# Patient Record
Sex: MALE | Race: WHITE | NOT HISPANIC OR LATINO | Employment: FULL TIME | URBAN - METROPOLITAN AREA
[De-identification: names, ages, dates, MRNs, and addresses within clinical notes are randomized per-mention and may not be internally consistent; named-entity substitution may affect disease eponyms.]

---

## 2023-03-08 PROBLEM — B37.2 SKIN YEAST INFECTION: Status: ACTIVE | Noted: 2023-03-08

## 2023-03-08 PROBLEM — R26.2 DIFFICULTY WALKING: Status: ACTIVE | Noted: 2023-03-08

## 2023-03-08 PROBLEM — F32.A DEPRESSION: Status: ACTIVE | Noted: 2023-03-08

## 2023-03-08 PROBLEM — E11.9 TYPE 2 DIABETES MELLITUS (MULTI): Status: ACTIVE | Noted: 2023-03-08

## 2023-03-08 PROBLEM — M25.552 LEFT HIP PAIN: Status: ACTIVE | Noted: 2023-03-08

## 2023-03-08 PROBLEM — M21.70 SHORT LEG SYNDROME, RIGHT, ACQUIRED: Status: ACTIVE | Noted: 2023-03-08

## 2023-03-08 PROBLEM — R05.9 COUGH IN ADULT: Status: ACTIVE | Noted: 2023-03-08
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
## 2023-03-08 PROBLEM — M25.859 HIP IMPINGEMENT SYNDROME: Status: ACTIVE | Noted: 2023-03-08

## 2023-03-08 PROBLEM — J06.9 ACUTE URI: Status: ACTIVE | Noted: 2023-03-08

## 2023-03-08 PROBLEM — M54.10 RADICULAR PAIN OF LEFT LOWER EXTREMITY: Status: ACTIVE | Noted: 2023-03-08

## 2023-03-08 PROBLEM — M65.4 TENOSYNOVITIS, DE QUERVAIN: Status: ACTIVE | Noted: 2023-03-08

## 2023-03-08 PROBLEM — E78.5 HLD (HYPERLIPIDEMIA): Status: ACTIVE | Noted: 2023-03-08

## 2023-03-08 RX ORDER — LISINOPRIL 10 MG/1
10 TABLET ORAL DAILY
COMMUNITY
Start: 2019-05-07 | End: 2023-11-02 | Stop reason: SDUPTHER

## 2023-03-08 RX ORDER — CITALOPRAM 40 MG/1
1 TABLET, FILM COATED ORAL DAILY
COMMUNITY
Start: 2021-05-06 | End: 2024-01-12 | Stop reason: SDUPTHER

## 2023-03-08 RX ORDER — METFORMIN HYDROCHLORIDE 500 MG/1
TABLET, EXTENDED RELEASE ORAL
COMMUNITY
Start: 2019-11-19 | End: 2024-02-23 | Stop reason: SDUPTHER

## 2023-03-08 RX ORDER — ATORVASTATIN CALCIUM 40 MG/1
1 TABLET, FILM COATED ORAL NIGHTLY
COMMUNITY
Start: 2019-11-19 | End: 2023-07-13 | Stop reason: SDUPTHER

## 2023-03-08 RX ORDER — BLOOD SUGAR DIAGNOSTIC
STRIP MISCELLANEOUS
COMMUNITY
Start: 2019-11-19 | End: 2024-03-19

## 2023-03-08 RX ORDER — MELOXICAM 7.5 MG/1
1 TABLET ORAL DAILY PRN
COMMUNITY
Start: 2022-02-03 | End: 2024-01-12 | Stop reason: SDUPTHER

## 2023-03-08 RX ORDER — GLIMEPIRIDE 2 MG/1
2 TABLET ORAL
COMMUNITY
Start: 2021-01-12 | End: 2024-01-08

## 2023-03-08 RX ORDER — SOD SULF/POT CHLORIDE/MAG SULF 1.479 G
TABLET ORAL
COMMUNITY
Start: 2022-09-06 | End: 2024-03-19 | Stop reason: WASHOUT

## 2023-03-09 PROBLEM — E66.3 OVERWEIGHT WITH BODY MASS INDEX (BMI) OF 27 TO 27.9 IN ADULT: Status: ACTIVE | Noted: 2023-03-09

## 2023-03-20 ENCOUNTER — APPOINTMENT (OUTPATIENT)
Dept: PRIMARY CARE | Facility: CLINIC | Age: 48
End: 2023-03-20
Payer: COMMERCIAL

## 2023-03-27 ENCOUNTER — APPOINTMENT (OUTPATIENT)
Dept: PRIMARY CARE | Facility: CLINIC | Age: 48
End: 2023-03-27
Payer: COMMERCIAL

## 2023-04-10 ENCOUNTER — APPOINTMENT (OUTPATIENT)
Dept: LAB | Facility: LAB | Age: 48
End: 2023-04-10
Payer: COMMERCIAL

## 2023-04-10 ENCOUNTER — OFFICE VISIT (OUTPATIENT)
Dept: PRIMARY CARE | Facility: CLINIC | Age: 48
End: 2023-04-10
Payer: COMMERCIAL

## 2023-04-10 VITALS
BODY MASS INDEX: 27.96 KG/M2 | HEART RATE: 85 BPM | TEMPERATURE: 97.3 F | RESPIRATION RATE: 16 BRPM | SYSTOLIC BLOOD PRESSURE: 108 MMHG | OXYGEN SATURATION: 95 % | DIASTOLIC BLOOD PRESSURE: 71 MMHG | WEIGHT: 200.5 LBS

## 2023-04-10 DIAGNOSIS — E11.9 TYPE 2 DIABETES MELLITUS WITHOUT COMPLICATION, WITHOUT LONG-TERM CURRENT USE OF INSULIN (MULTI): Primary | ICD-10-CM

## 2023-04-10 DIAGNOSIS — Z12.5 SCREENING PSA (PROSTATE SPECIFIC ANTIGEN): ICD-10-CM

## 2023-04-10 DIAGNOSIS — Z00.00 ROUTINE GENERAL MEDICAL EXAMINATION AT A HEALTH CARE FACILITY: ICD-10-CM

## 2023-04-10 DIAGNOSIS — F32.A DEPRESSION, UNSPECIFIED DEPRESSION TYPE: ICD-10-CM

## 2023-04-10 LAB — POC HEMOGLOBIN A1C: 8.1 % (ref 4.2–6.5)

## 2023-04-10 PROCEDURE — 3078F DIAST BP <80 MM HG: CPT | Performed by: STUDENT IN AN ORGANIZED HEALTH CARE EDUCATION/TRAINING PROGRAM

## 2023-04-10 PROCEDURE — 4010F ACE/ARB THERAPY RXD/TAKEN: CPT | Performed by: STUDENT IN AN ORGANIZED HEALTH CARE EDUCATION/TRAINING PROGRAM

## 2023-04-10 PROCEDURE — 83036 HEMOGLOBIN GLYCOSYLATED A1C: CPT | Performed by: STUDENT IN AN ORGANIZED HEALTH CARE EDUCATION/TRAINING PROGRAM

## 2023-04-10 PROCEDURE — 99213 OFFICE O/P EST LOW 20 MIN: CPT | Performed by: STUDENT IN AN ORGANIZED HEALTH CARE EDUCATION/TRAINING PROGRAM

## 2023-04-10 PROCEDURE — 3074F SYST BP LT 130 MM HG: CPT | Performed by: STUDENT IN AN ORGANIZED HEALTH CARE EDUCATION/TRAINING PROGRAM

## 2023-04-10 PROCEDURE — 99396 PREV VISIT EST AGE 40-64: CPT | Performed by: STUDENT IN AN ORGANIZED HEALTH CARE EDUCATION/TRAINING PROGRAM

## 2023-04-10 RX ORDER — BUPROPION HYDROCHLORIDE 150 MG/1
150 TABLET ORAL DAILY
Qty: 90 TABLET | Refills: 1 | Status: SHIPPED | OUTPATIENT
Start: 2023-04-10 | End: 2023-05-10 | Stop reason: SDUPTHER

## 2023-04-10 SDOH — ECONOMIC STABILITY: HOUSING INSECURITY
IN THE LAST 12 MONTHS, WAS THERE A TIME WHEN YOU DID NOT HAVE A STEADY PLACE TO SLEEP OR SLEPT IN A SHELTER (INCLUDING NOW)?: NO

## 2023-04-10 SDOH — HEALTH STABILITY: PHYSICAL HEALTH: ON AVERAGE, HOW MANY DAYS PER WEEK DO YOU ENGAGE IN MODERATE TO STRENUOUS EXERCISE (LIKE A BRISK WALK)?: 3 DAYS

## 2023-04-10 SDOH — ECONOMIC STABILITY: FOOD INSECURITY: WITHIN THE PAST 12 MONTHS, THE FOOD YOU BOUGHT JUST DIDN'T LAST AND YOU DIDN'T HAVE MONEY TO GET MORE.: NEVER TRUE

## 2023-04-10 SDOH — ECONOMIC STABILITY: FOOD INSECURITY: WITHIN THE PAST 12 MONTHS, YOU WORRIED THAT YOUR FOOD WOULD RUN OUT BEFORE YOU GOT MONEY TO BUY MORE.: NEVER TRUE

## 2023-04-10 SDOH — ECONOMIC STABILITY: INCOME INSECURITY: IN THE LAST 12 MONTHS, WAS THERE A TIME WHEN YOU WERE NOT ABLE TO PAY THE MORTGAGE OR RENT ON TIME?: NO

## 2023-04-10 SDOH — HEALTH STABILITY: PHYSICAL HEALTH: ON AVERAGE, HOW MANY MINUTES DO YOU ENGAGE IN EXERCISE AT THIS LEVEL?: 30 MIN

## 2023-04-10 SDOH — ECONOMIC STABILITY: TRANSPORTATION INSECURITY
IN THE PAST 12 MONTHS, HAS THE LACK OF TRANSPORTATION KEPT YOU FROM MEDICAL APPOINTMENTS OR FROM GETTING MEDICATIONS?: NO

## 2023-04-10 SDOH — ECONOMIC STABILITY: TRANSPORTATION INSECURITY
IN THE PAST 12 MONTHS, HAS LACK OF TRANSPORTATION KEPT YOU FROM MEETINGS, WORK, OR FROM GETTING THINGS NEEDED FOR DAILY LIVING?: NO

## 2023-04-10 ASSESSMENT — ENCOUNTER SYMPTOMS
HEMATURIA: 0
CONSTIPATION: 0
MYALGIAS: 0
PALPITATIONS: 0
DIARRHEA: 0
EYE PAIN: 0
SHORTNESS OF BREATH: 0
ARTHRALGIAS: 0
LOSS OF SENSATION IN FEET: 0
WHEEZING: 0
LIGHT-HEADEDNESS: 0
VOMITING: 0
SORE THROAT: 0
ABDOMINAL PAIN: 0
BLOOD IN STOOL: 0
CHILLS: 0
POLYDIPSIA: 0
WEAKNESS: 0
HEADACHES: 0
NUMBNESS: 0
OCCASIONAL FEELINGS OF UNSTEADINESS: 0
NAUSEA: 0
FEVER: 0
RHINORRHEA: 0
DEPRESSION: 0
COUGH: 0
DIZZINESS: 0
DYSURIA: 0
ADENOPATHY: 0

## 2023-04-10 ASSESSMENT — SOCIAL DETERMINANTS OF HEALTH (SDOH)
HOW OFTEN DO YOU ATTEND CHURCH OR RELIGIOUS SERVICES?: NEVER
WITHIN THE LAST YEAR, HAVE TO BEEN RAPED OR FORCED TO HAVE ANY KIND OF SEXUAL ACTIVITY BY YOUR PARTNER OR EX-PARTNER?: NO
WITHIN THE LAST YEAR, HAVE YOU BEEN AFRAID OF YOUR PARTNER OR EX-PARTNER?: NO
HOW OFTEN DO YOU ATTENT MEETINGS OF THE CLUB OR ORGANIZATION YOU BELONG TO?: NEVER
HOW HARD IS IT FOR YOU TO PAY FOR THE VERY BASICS LIKE FOOD, HOUSING, MEDICAL CARE, AND HEATING?: NOT HARD AT ALL
WITHIN THE LAST YEAR, HAVE YOU BEEN HUMILIATED OR EMOTIONALLY ABUSED IN OTHER WAYS BY YOUR PARTNER OR EX-PARTNER?: NO
WITHIN THE LAST YEAR, HAVE YOU BEEN KICKED, HIT, SLAPPED, OR OTHERWISE PHYSICALLY HURT BY YOUR PARTNER OR EX-PARTNER?: NO
IN A TYPICAL WEEK, HOW MANY TIMES DO YOU TALK ON THE PHONE WITH FAMILY, FRIENDS, OR NEIGHBORS?: TWICE A WEEK
HOW OFTEN DO YOU GET TOGETHER WITH FRIENDS OR RELATIVES?: TWICE A WEEK
DO YOU BELONG TO ANY CLUBS OR ORGANIZATIONS SUCH AS CHURCH GROUPS UNIONS, FRATERNAL OR ATHLETIC GROUPS, OR SCHOOL GROUPS?: NO

## 2023-04-10 ASSESSMENT — LIFESTYLE VARIABLES
HOW OFTEN DO YOU HAVE SIX OR MORE DRINKS ON ONE OCCASION: NEVER
HOW MANY STANDARD DRINKS CONTAINING ALCOHOL DO YOU HAVE ON A TYPICAL DAY: PATIENT DOES NOT DRINK
SKIP TO QUESTIONS 9-10: 1
AUDIT-C TOTAL SCORE: 0
HOW OFTEN DO YOU HAVE A DRINK CONTAINING ALCOHOL: NEVER

## 2023-04-10 NOTE — PROGRESS NOTES
Subjective   Danial Navarro Jr. is a 47 y.o. male who is here for a routine exam.  Active Problem List      Comprehensive Medical/Surgical/Social/Family History  Past Medical History:   Diagnosis Date    Personal history of diseases of the skin and subcutaneous tissue 10/27/2018    History of cellulitis and abscess     No past surgical history on file.  Social History     Social History Narrative    Not on file         Allergies and Medications  Penicillins  Current Outpatient Medications on File Prior to Visit   Medication Sig Dispense Refill    atorvastatin (Lipitor) 40 mg tablet Take 1 tablet (40 mg) by mouth once daily at bedtime.      blood sugar diagnostic (Accu-Chek Guide test strips) strip Per directed      citalopram (CeleXA) 40 mg tablet Take 1 tablet (40 mg) by mouth once daily.      glimepiride (Amaryl) 2 mg tablet Take 1 tablet (2 mg) by mouth. TAKE 1 AND 1/2 TAB DAILY      lisinopril 10 mg tablet Take 1 tablet (10 mg) by mouth once daily.      meloxicam (Mobic) 7.5 mg tablet Take 1 tablet (7.5 mg) by mouth once daily as needed.      metFORMIN XR (Glucophage-XR) 500 mg 24 hr tablet Take by mouth. 2 TABLETS TWICE DIALY      sod sulf-pot chloride-mag sulf (Sutab) 1.479-0.188- 0.225 gram tablet Take by mouth. TAKE PER DIRECTED ON BOX       No current facility-administered medications on file prior to visit.       New concerns:  Depression stable. Unsure of what medication he is on.     A1c - 8.1% (stable from July 2022, 8.2%)  Checks BG: . During the day typically 120-130s.    Lifestyle  Diet: Balanced diet. No fast food. Rare sweets. Diet sodas - LOTS.  Exercise: Not as active. Starting to walk more.  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    Colonoscopy 10/2022 - follow up 5 years.  Tdap 2020.    Diabetic Review of Systems - medication compliance: compliant most of the time, diabetic diet compliance: noncompliant some of the time.    none  Hemoglobin A1c has been 8 the  last 2 times, he states he can improve his diet and exercise.  Been gradually improving over the past several weeks.  He does not want to start any medication at this time.    No additional diabetic complications        Review of Systems   Constitutional:  Negative for chills and fever.   HENT:  Negative for congestion, ear pain, rhinorrhea and sore throat.    Eyes:  Negative for pain and visual disturbance.   Respiratory:  Negative for cough, shortness of breath and wheezing.    Cardiovascular:  Negative for chest pain, palpitations and leg swelling.   Gastrointestinal:  Negative for abdominal pain, blood in stool, constipation, diarrhea, nausea and vomiting.   Endocrine: Negative for polydipsia and polyuria.   Genitourinary:  Negative for dysuria and hematuria.   Musculoskeletal:  Negative for arthralgias and myalgias.   Skin:  Negative for rash.   Neurological:  Negative for dizziness, syncope, weakness, light-headedness, numbness and headaches.   Hematological:  Negative for adenopathy.   Psychiatric/Behavioral:  Negative for self-injury and suicidal ideas.        Objective   /71 (BP Location: Right arm, Patient Position: Sitting)   Pulse 85   Temp 36.3 °C (97.3 °F) (Temporal)   Resp 16   Wt 90.9 kg (200 lb 8 oz)   SpO2 95%   BMI 27.96 kg/m²     Physical Exam  Vitals and nursing note reviewed.   Constitutional:       General: He is not in acute distress.     Appearance: Normal appearance. He is not toxic-appearing.   HENT:      Head: Normocephalic and atraumatic.      Right Ear: Tympanic membrane, ear canal and external ear normal.      Left Ear: Tympanic membrane, ear canal and external ear normal.      Nose: Nose normal. No rhinorrhea.      Mouth/Throat:      Mouth: Mucous membranes are moist.      Pharynx: Oropharynx is clear. No oropharyngeal exudate or posterior oropharyngeal erythema.   Eyes:      Extraocular Movements: Extraocular movements intact.      Conjunctiva/sclera: Conjunctivae normal.       Pupils: Pupils are equal, round, and reactive to light.   Neck:      Thyroid: No thyroid mass or thyroid tenderness.   Cardiovascular:      Rate and Rhythm: Normal rate and regular rhythm.      Pulses: Normal pulses.      Heart sounds: Normal heart sounds. No murmur heard.  Pulmonary:      Effort: Pulmonary effort is normal. No respiratory distress.      Breath sounds: Normal breath sounds.   Abdominal:      General: Abdomen is flat. Bowel sounds are normal.      Palpations: Abdomen is soft.      Tenderness: There is no abdominal tenderness. There is no guarding or rebound.   Musculoskeletal:         General: Normal range of motion.      Cervical back: Normal range of motion.      Right lower leg: No edema.      Left lower leg: No edema.      Comments: Strength and sensation intact b/l UE and LE.    Lymphadenopathy:      Cervical: No cervical adenopathy.   Skin:     General: Skin is warm and dry.   Neurological:      General: No focal deficit present.      Mental Status: He is alert and oriented to person, place, and time. Mental status is at baseline.      Cranial Nerves: No cranial nerve deficit.      Sensory: No sensory deficit.      Motor: No weakness.      Coordination: Coordination normal.      Gait: Gait normal.   Psychiatric:         Mood and Affect: Mood normal.         Speech: Speech normal.         Behavior: Behavior normal.         Thought Content: Thought content normal.         Judgment: Judgment normal.       Assessment/Plan   Problem List Items Addressed This Visit          Endocrine/Metabolic    Type 2 diabetes mellitus (CMS/HCC) - Primary    Relevant Orders    Follow Up In [facility] - PCP    CBC    Comprehensive Metabolic Panel    Lipid Panel    POCT glycosylated hemoglobin (Hb A1C) manually resulted (Completed)     Other Visit Diagnoses       Routine general medical examination at a health care facility        Relevant Orders    CBC    Comprehensive Metabolic Panel    Lipid Panel     Screening PSA (prostate specific antigen)        Relevant Orders    Prostate Specific Antigen            Reviewed Social Determinants of health with patient, discussed healthy lifestyle including 150 minutes of physical activity per week  Ordered/Reviewed baseline labwork -CBC, CMP, Lipid Panel, PSA  Immunizations Up-to-Date    Colonoscopy 2022  We will get routine lab work add hemoglobin A1c today.    We will get prostate screening earlier due to symptoms.    Follow-up in 3 months for evaluation of new Wellbutrin to aid with mood.  Specifically motivation and help with side effect of decreased appetite.    Recheck hemoglobin A1c at that time, consider SGLT2 or GLP-1 if necessary.

## 2023-04-14 ENCOUNTER — LAB (OUTPATIENT)
Dept: LAB | Facility: LAB | Age: 48
End: 2023-04-14
Payer: COMMERCIAL

## 2023-04-14 DIAGNOSIS — Z12.5 SCREENING PSA (PROSTATE SPECIFIC ANTIGEN): ICD-10-CM

## 2023-04-14 DIAGNOSIS — Z00.00 ROUTINE GENERAL MEDICAL EXAMINATION AT A HEALTH CARE FACILITY: ICD-10-CM

## 2023-04-14 DIAGNOSIS — E11.9 TYPE 2 DIABETES MELLITUS WITHOUT COMPLICATION, WITHOUT LONG-TERM CURRENT USE OF INSULIN (MULTI): ICD-10-CM

## 2023-04-14 PROCEDURE — 84153 ASSAY OF PSA TOTAL: CPT

## 2023-04-14 PROCEDURE — 85027 COMPLETE CBC AUTOMATED: CPT

## 2023-04-14 PROCEDURE — 80061 LIPID PANEL: CPT

## 2023-04-14 PROCEDURE — 80053 COMPREHEN METABOLIC PANEL: CPT

## 2023-04-14 PROCEDURE — 36415 COLL VENOUS BLD VENIPUNCTURE: CPT

## 2023-04-14 PROCEDURE — 84443 ASSAY THYROID STIM HORMONE: CPT

## 2023-04-15 LAB
ALANINE AMINOTRANSFERASE (SGPT) (U/L) IN SER/PLAS: 36 U/L (ref 10–52)
ALBUMIN (G/DL) IN SER/PLAS: 4.7 G/DL (ref 3.4–5)
ALKALINE PHOSPHATASE (U/L) IN SER/PLAS: 112 U/L (ref 33–120)
ANION GAP IN SER/PLAS: 14 MMOL/L (ref 10–20)
ASPARTATE AMINOTRANSFERASE (SGOT) (U/L) IN SER/PLAS: 18 U/L (ref 9–39)
BILIRUBIN TOTAL (MG/DL) IN SER/PLAS: 1.3 MG/DL (ref 0–1.2)
CALCIUM (MG/DL) IN SER/PLAS: 10.4 MG/DL (ref 8.6–10.6)
CARBON DIOXIDE, TOTAL (MMOL/L) IN SER/PLAS: 27 MMOL/L (ref 21–32)
CHLORIDE (MMOL/L) IN SER/PLAS: 103 MMOL/L (ref 98–107)
CHOLESTEROL (MG/DL) IN SER/PLAS: 138 MG/DL (ref 0–199)
CHOLESTEROL IN HDL (MG/DL) IN SER/PLAS: 35 MG/DL
CHOLESTEROL/HDL RATIO: 3.9
CREATININE (MG/DL) IN SER/PLAS: 0.95 MG/DL (ref 0.5–1.3)
ERYTHROCYTE DISTRIBUTION WIDTH (RATIO) BY AUTOMATED COUNT: 14.1 % (ref 11.5–14.5)
ERYTHROCYTE MEAN CORPUSCULAR HEMOGLOBIN CONCENTRATION (G/DL) BY AUTOMATED: 31.9 G/DL (ref 32–36)
ERYTHROCYTE MEAN CORPUSCULAR VOLUME (FL) BY AUTOMATED COUNT: 89 FL (ref 80–100)
ERYTHROCYTES (10*6/UL) IN BLOOD BY AUTOMATED COUNT: 5.33 X10E12/L (ref 4.5–5.9)
GFR MALE: >90 ML/MIN/1.73M2
GLUCOSE (MG/DL) IN SER/PLAS: 222 MG/DL (ref 74–99)
HEMATOCRIT (%) IN BLOOD BY AUTOMATED COUNT: 47.3 % (ref 41–52)
HEMOGLOBIN (G/DL) IN BLOOD: 15.1 G/DL (ref 13.5–17.5)
LDL: 61 MG/DL (ref 0–99)
LEUKOCYTES (10*3/UL) IN BLOOD BY AUTOMATED COUNT: 6 X10E9/L (ref 4.4–11.3)
NON HDL CHOLESTEROL: 103 MG/DL
PLATELETS (10*3/UL) IN BLOOD AUTOMATED COUNT: 167 X10E9/L (ref 150–450)
POTASSIUM (MMOL/L) IN SER/PLAS: 5.7 MMOL/L (ref 3.5–5.3)
PROSTATE SPECIFIC AG (NG/ML) IN SER/PLAS: 0.56 NG/ML (ref 0–4)
PROTEIN TOTAL: 7 G/DL (ref 6.4–8.2)
SODIUM (MMOL/L) IN SER/PLAS: 138 MMOL/L (ref 136–145)
THYROTROPIN (MIU/L) IN SER/PLAS BY DETECTION LIMIT <= 0.05 MIU/L: 2.3 MIU/L (ref 0.44–3.98)
TRIGLYCERIDE (MG/DL) IN SER/PLAS: 208 MG/DL (ref 0–149)
UREA NITROGEN (MG/DL) IN SER/PLAS: 12 MG/DL (ref 6–23)
VLDL: 42 MG/DL (ref 0–40)

## 2023-04-17 ENCOUNTER — TELEPHONE (OUTPATIENT)
Dept: PRIMARY CARE | Facility: CLINIC | Age: 48
End: 2023-04-17
Payer: COMMERCIAL

## 2023-04-17 NOTE — TELEPHONE ENCOUNTER
----- Message from Akbar Youssef DO sent at 4/16/2023  9:16 AM EDT -----  Please call patient back with lab results.  Let him know that blood sugar was elevated consistent with diabetes, but potassium is also elevated.  It is only mildly elevated but something that we are going to keep an eye on, I want to recheck that level and your blood sugar in 3 months.  Please schedule that appointment and then call prior to the appointment to set up lab work.

## 2023-05-10 DIAGNOSIS — F32.A DEPRESSION, UNSPECIFIED DEPRESSION TYPE: ICD-10-CM

## 2023-05-10 RX ORDER — BUPROPION HYDROCHLORIDE 150 MG/1
150 TABLET ORAL DAILY
Qty: 90 TABLET | Refills: 1 | Status: SHIPPED | OUTPATIENT
Start: 2023-05-10 | End: 2023-07-21 | Stop reason: SDUPTHER

## 2023-07-13 ENCOUNTER — TELEPHONE (OUTPATIENT)
Dept: PRIMARY CARE | Facility: CLINIC | Age: 48
End: 2023-07-13
Payer: COMMERCIAL

## 2023-07-13 DIAGNOSIS — E78.5 HYPERLIPIDEMIA, UNSPECIFIED HYPERLIPIDEMIA TYPE: Primary | ICD-10-CM

## 2023-07-13 RX ORDER — ATORVASTATIN CALCIUM 40 MG/1
40 TABLET, FILM COATED ORAL NIGHTLY
Qty: 90 TABLET | Refills: 2 | Status: SHIPPED | OUTPATIENT
Start: 2023-07-13 | End: 2023-07-21 | Stop reason: SDUPTHER

## 2023-07-21 DIAGNOSIS — E78.5 HYPERLIPIDEMIA, UNSPECIFIED HYPERLIPIDEMIA TYPE: ICD-10-CM

## 2023-07-21 DIAGNOSIS — F32.A DEPRESSION, UNSPECIFIED DEPRESSION TYPE: ICD-10-CM

## 2023-07-21 RX ORDER — BUPROPION HYDROCHLORIDE 150 MG/1
300 TABLET ORAL DAILY
Qty: 90 TABLET | Refills: 1 | Status: SHIPPED | OUTPATIENT
Start: 2023-07-21 | End: 2023-11-02 | Stop reason: ALTCHOICE

## 2023-07-21 RX ORDER — ATORVASTATIN CALCIUM 40 MG/1
40 TABLET, FILM COATED ORAL NIGHTLY
Qty: 90 TABLET | Refills: 2 | Status: SHIPPED | OUTPATIENT
Start: 2023-07-21 | End: 2023-11-09

## 2023-08-21 ENCOUNTER — APPOINTMENT (OUTPATIENT)
Dept: PRIMARY CARE | Facility: CLINIC | Age: 48
End: 2023-08-21
Payer: COMMERCIAL

## 2023-09-20 DIAGNOSIS — E11.9 TYPE 2 DIABETES MELLITUS WITHOUT COMPLICATION, WITHOUT LONG-TERM CURRENT USE OF INSULIN (MULTI): Primary | ICD-10-CM

## 2023-10-20 ENCOUNTER — APPOINTMENT (OUTPATIENT)
Dept: PRIMARY CARE | Facility: CLINIC | Age: 48
End: 2023-10-20
Payer: COMMERCIAL

## 2023-10-23 ENCOUNTER — APPOINTMENT (OUTPATIENT)
Dept: PRIMARY CARE | Facility: CLINIC | Age: 48
End: 2023-10-23
Payer: COMMERCIAL

## 2023-11-02 ENCOUNTER — OFFICE VISIT (OUTPATIENT)
Dept: PRIMARY CARE | Facility: CLINIC | Age: 48
End: 2023-11-02
Payer: COMMERCIAL

## 2023-11-02 VITALS
SYSTOLIC BLOOD PRESSURE: 103 MMHG | OXYGEN SATURATION: 93 % | WEIGHT: 196.5 LBS | TEMPERATURE: 97.2 F | DIASTOLIC BLOOD PRESSURE: 73 MMHG | HEART RATE: 113 BPM | BODY MASS INDEX: 27.41 KG/M2 | RESPIRATION RATE: 16 BRPM

## 2023-11-02 DIAGNOSIS — E11.9 TYPE 2 DIABETES MELLITUS WITHOUT COMPLICATION, WITHOUT LONG-TERM CURRENT USE OF INSULIN (MULTI): ICD-10-CM

## 2023-11-02 DIAGNOSIS — F33.1 MODERATE EPISODE OF RECURRENT MAJOR DEPRESSIVE DISORDER (MULTI): Primary | ICD-10-CM

## 2023-11-02 LAB — POC HEMOGLOBIN A1C: 9.3 % (ref 4.2–6.5)

## 2023-11-02 PROCEDURE — 4010F ACE/ARB THERAPY RXD/TAKEN: CPT | Performed by: STUDENT IN AN ORGANIZED HEALTH CARE EDUCATION/TRAINING PROGRAM

## 2023-11-02 PROCEDURE — 99214 OFFICE O/P EST MOD 30 MIN: CPT | Performed by: STUDENT IN AN ORGANIZED HEALTH CARE EDUCATION/TRAINING PROGRAM

## 2023-11-02 PROCEDURE — 83036 HEMOGLOBIN GLYCOSYLATED A1C: CPT | Performed by: STUDENT IN AN ORGANIZED HEALTH CARE EDUCATION/TRAINING PROGRAM

## 2023-11-02 PROCEDURE — 3078F DIAST BP <80 MM HG: CPT | Performed by: STUDENT IN AN ORGANIZED HEALTH CARE EDUCATION/TRAINING PROGRAM

## 2023-11-02 PROCEDURE — 3074F SYST BP LT 130 MM HG: CPT | Performed by: STUDENT IN AN ORGANIZED HEALTH CARE EDUCATION/TRAINING PROGRAM

## 2023-11-02 RX ORDER — LISINOPRIL 10 MG/1
10 TABLET ORAL DAILY
Qty: 90 TABLET | Refills: 3 | Status: SHIPPED | OUTPATIENT
Start: 2023-11-02 | End: 2024-01-12

## 2023-11-02 NOTE — PROGRESS NOTES
SUBJECTIVE:  48 y.o. male for follow up of diabetes. Diabetic Review of Systems - medication compliance: compliant all of the time, diabetic diet compliance: noncompliant much of the time.    none    Patient seen concern for worsening depressive symptoms, he states that since doing Wellbutrin is no significant difference, has been on the citalopram for significant mount of time.  Is interested in seeing psychiatry, is interested in getting Social Security disability for this as well, he is not working and does not feel motivated.  Does not exercise.  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    He feels that with more motivation he would better take care of himself with his diabetes, is currently metformin and glimepiride.    Significant elevated hemoglobin A1c today as well.    Current Outpatient Medications   Medication Sig Dispense Refill    atorvastatin (Lipitor) 40 mg tablet Take 1 tablet (40 mg) by mouth once daily at bedtime. 90 tablet 2    blood sugar diagnostic (Accu-Chek Guide test strips) strip Per directed      citalopram (CeleXA) 40 mg tablet Take 1 tablet (40 mg) by mouth once daily.      glimepiride (Amaryl) 2 mg tablet Take 1 tablet (2 mg) by mouth. TAKE 1 AND 1/2 TAB DAILY      lisinopril 10 mg tablet Take 1 tablet (10 mg) by mouth once daily.      meloxicam (Mobic) 7.5 mg tablet Take 1 tablet (7.5 mg) by mouth once daily as needed.      metFORMIN XR (Glucophage-XR) 500 mg 24 hr tablet Take by mouth. 2 TABLETS TWICE DIALY      sod sulf-pot chloride-mag sulf (Sutab) 1.479-0.188- 0.225 gram tablet Take by mouth. TAKE PER DIRECTED ON BOX      buPROPion XL (Wellbutrin XL) 150 mg 24 hr tablet Take 2 tablets (300 mg) by mouth once daily. Do not crush, chew, or split. 90 tablet 1     No current facility-administered medications for this visit.       OBJECTIVE:  /73 (BP Location: Right arm, Patient Position: Sitting)   Pulse  (!) 113   Temp 36.2 °C (97.2 °F) (Temporal)   Resp 16   Wt 89.1 kg (196 lb 8 oz)   SpO2 93%   BMI 27.41 kg/m²   Physical Exam  Vitals reviewed.   Constitutional:       General: He is not in acute distress.     Appearance: Normal appearance. He is not toxic-appearing.   HENT:      Head: Normocephalic and atraumatic.      Nose: Nose normal.   Eyes:      Extraocular Movements: Extraocular movements intact.   Cardiovascular:      Rate and Rhythm: Normal rate and regular rhythm.      Heart sounds: No murmur heard.     No friction rub. No gallop.   Pulmonary:      Effort: Pulmonary effort is normal. No respiratory distress.      Breath sounds: Normal breath sounds. No wheezing, rhonchi or rales.   Skin:     General: Skin is warm and dry.   Neurological:      General: No focal deficit present.      Mental Status: He is alert.   Psychiatric:         Mood and Affect: Mood is depressed. Affect is tearful.         Behavior: Behavior normal.         Assessment/Plan    Problem List Items Addressed This Visit       Type 2 diabetes mellitus (CMS/[facility])    Relevant Orders    POCT glycosylated hemoglobin (Hb A1C) manually resulted (Completed)        Lab Results   Component Value Date    HGBA1C 9.3 (A) 11/02/2023     Continue treatment including: Metformin and Sulfonylurea  Complications include: none  Continue to monitor blood sugars  Continue with follow ups including:   Ophthalmology: Up-to-date  Podiatry: Not Indicated  Cardiology: Not Indicated  Discussed healthy, low carb diet and trying to get 150 minutes of physical activity per week.  Follow up in 3 months or sooner if new concerns arise'    For patient's mood, discontinue Wellbutrin, we will trial Vraylar.  If this does not work consider Abilify.  We will refer to psychiatry.    Given samples of Ozempic, we will see how his blood sugar is in 1 month, we will see how mood is as well.    Follow-up as new concerns arise.

## 2023-11-09 DIAGNOSIS — E78.5 HYPERLIPIDEMIA, UNSPECIFIED HYPERLIPIDEMIA TYPE: ICD-10-CM

## 2023-11-09 RX ORDER — ATORVASTATIN CALCIUM 40 MG/1
40 TABLET, FILM COATED ORAL DAILY
Qty: 90 TABLET | Refills: 3 | Status: SHIPPED | OUTPATIENT
Start: 2023-11-09 | End: 2024-11-08

## 2023-11-27 ENCOUNTER — TELEPHONE (OUTPATIENT)
Dept: PRIMARY CARE | Facility: CLINIC | Age: 48
End: 2023-11-27
Payer: COMMERCIAL

## 2023-12-04 ENCOUNTER — APPOINTMENT (OUTPATIENT)
Dept: PRIMARY CARE | Facility: CLINIC | Age: 48
End: 2023-12-04
Payer: COMMERCIAL

## 2023-12-22 ENCOUNTER — APPOINTMENT (OUTPATIENT)
Dept: PRIMARY CARE | Facility: CLINIC | Age: 48
End: 2023-12-22
Payer: COMMERCIAL

## 2024-01-08 ENCOUNTER — OFFICE VISIT (OUTPATIENT)
Dept: PRIMARY CARE | Facility: CLINIC | Age: 49
End: 2024-01-08
Payer: COMMERCIAL

## 2024-01-08 VITALS
RESPIRATION RATE: 16 BRPM | OXYGEN SATURATION: 93 % | BODY MASS INDEX: 27.32 KG/M2 | TEMPERATURE: 97.7 F | WEIGHT: 195.13 LBS | DIASTOLIC BLOOD PRESSURE: 65 MMHG | SYSTOLIC BLOOD PRESSURE: 103 MMHG | HEART RATE: 97 BPM | HEIGHT: 71 IN

## 2024-01-08 DIAGNOSIS — E11.9 TYPE 2 DIABETES MELLITUS WITHOUT COMPLICATION, WITHOUT LONG-TERM CURRENT USE OF INSULIN (MULTI): ICD-10-CM

## 2024-01-08 DIAGNOSIS — E11.9 TYPE 2 DIABETES MELLITUS WITHOUT COMPLICATION, WITHOUT LONG-TERM CURRENT USE OF INSULIN (MULTI): Primary | ICD-10-CM

## 2024-01-08 DIAGNOSIS — E78.5 HYPERLIPIDEMIA, UNSPECIFIED HYPERLIPIDEMIA TYPE: ICD-10-CM

## 2024-01-08 DIAGNOSIS — I15.9 SECONDARY HYPERTENSION: Primary | ICD-10-CM
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
## 2024-01-08 PROBLEM — R26.2 DIFFICULTY WALKING: Status: RESOLVED | Noted: 2023-03-08 | Resolved: 2024-01-08

## 2024-01-08 PROBLEM — J06.9 ACUTE URI: Status: RESOLVED | Noted: 2023-03-08 | Resolved: 2024-01-08

## 2024-01-08 PROBLEM — L73.2 HIDRADENITIS SUPPURATIVA: Status: RESOLVED | Noted: 2020-01-29 | Resolved: 2024-01-08

## 2024-01-08 PROBLEM — R05.9 COUGH IN ADULT: Status: RESOLVED | Noted: 2023-03-08 | Resolved: 2024-01-08

## 2024-01-08 PROBLEM — M25.552 LEFT HIP PAIN: Status: RESOLVED | Noted: 2023-03-08 | Resolved: 2024-01-08

## 2024-01-08 PROCEDURE — 3078F DIAST BP <80 MM HG: CPT | Performed by: STUDENT IN AN ORGANIZED HEALTH CARE EDUCATION/TRAINING PROGRAM

## 2024-01-08 PROCEDURE — 4010F ACE/ARB THERAPY RXD/TAKEN: CPT | Performed by: STUDENT IN AN ORGANIZED HEALTH CARE EDUCATION/TRAINING PROGRAM

## 2024-01-08 PROCEDURE — 90471 IMMUNIZATION ADMIN: CPT | Performed by: STUDENT IN AN ORGANIZED HEALTH CARE EDUCATION/TRAINING PROGRAM

## 2024-01-08 PROCEDURE — 3074F SYST BP LT 130 MM HG: CPT | Performed by: STUDENT IN AN ORGANIZED HEALTH CARE EDUCATION/TRAINING PROGRAM

## 2024-01-08 PROCEDURE — 99396 PREV VISIT EST AGE 40-64: CPT | Performed by: STUDENT IN AN ORGANIZED HEALTH CARE EDUCATION/TRAINING PROGRAM

## 2024-01-08 PROCEDURE — 90750 HZV VACC RECOMBINANT IM: CPT | Performed by: STUDENT IN AN ORGANIZED HEALTH CARE EDUCATION/TRAINING PROGRAM

## 2024-01-08 RX ORDER — GLIMEPIRIDE 2 MG/1
3 TABLET ORAL DAILY
Qty: 135 TABLET | Refills: 3 | Status: SHIPPED | OUTPATIENT
Start: 2024-01-08 | End: 2024-04-23 | Stop reason: ALTCHOICE

## 2024-01-08 SDOH — ECONOMIC STABILITY: GENERAL
WHICH OF THE FOLLOWING WOULD YOU LIKE TO GET CONNECTED TO IN ORDER TO RECEIVE A DISCOUNT OR FOR FREE? (CHOOSE ALL THAT APPLY): NONE OF THESE

## 2024-01-08 SDOH — ECONOMIC STABILITY: GENERAL
WHICH OF THE FOLLOWING DO YOU KNOW HOW TO USE AND HAVE ACCESS TO EVERY DAY? (CHOOSE ALL THAT APPLY): DESKTOP COMPUTER, LAPTOP COMPUTER, OR TABLET WITH BROADBAND INTERNET CONNECTION;SMARTPHONE WITH CELLULAR DATA PLAN

## 2024-01-08 ASSESSMENT — ENCOUNTER SYMPTOMS
LOSS OF SENSATION IN FEET: 0
DEPRESSION: 0
OCCASIONAL FEELINGS OF UNSTEADINESS: 0

## 2024-01-08 ASSESSMENT — PATIENT HEALTH QUESTIONNAIRE - PHQ9
1. LITTLE INTEREST OR PLEASURE IN DOING THINGS: NOT AT ALL
SUM OF ALL RESPONSES TO PHQ9 QUESTIONS 1 & 2: 0
2. FEELING DOWN, DEPRESSED OR HOPELESS: NOT AT ALL

## 2024-01-08 ASSESSMENT — SOCIAL DETERMINANTS OF HEALTH (SDOH): IN THE PAST 12 MONTHS, HAS THE ELECTRIC, GAS, OIL, OR WATER COMPANY THREATENED TO SHUT OFF SERVICE IN YOUR HOME?: NO

## 2024-01-08 NOTE — PROGRESS NOTES
Subjective   Danial Navarro Jr. is a 48 y.o. male who is here for a routine exam.  Active Problem List      Comprehensive Medical/Surgical/Social/Family History  Past Medical History:   Diagnosis Date    Chronic cholecystitis 11/19/2012    Hidradenitis suppurativa 01/29/2020    Personal history of diseases of the skin and subcutaneous tissue 10/27/2018    History of cellulitis and abscess     History reviewed. No pertinent surgical history.  Social History     Social History Narrative    Not on file       Allergies and Medications  Penicillins  Current Outpatient Medications on File Prior to Visit   Medication Sig Dispense Refill    atorvastatin (Lipitor) 40 mg tablet Take 1 tablet (40 mg) by mouth once daily. 90 tablet 3    blood sugar diagnostic (Accu-Chek Guide test strips) strip Per directed      cariprazine (Vraylar) 1.5 mg capsule Take 1 capsule (1.5 mg) by mouth once daily. 30 capsule 2    citalopram (CeleXA) 40 mg tablet Take 1 tablet (40 mg) by mouth once daily.      lisinopril 10 mg tablet Take 1 tablet (10 mg) by mouth once daily. 90 tablet 3    meloxicam (Mobic) 7.5 mg tablet Take 1 tablet (7.5 mg) by mouth once daily as needed.      metFORMIN XR (Glucophage-XR) 500 mg 24 hr tablet Take by mouth. 2 TABLETS TWICE DIALY      [DISCONTINUED] glimepiride (Amaryl) 2 mg tablet Take 1 tablet (2 mg) by mouth. TAKE 1 AND 1/2 TAB DAILY      sod sulf-pot chloride-mag sulf (Sutab) 1.479-0.188- 0.225 gram tablet Take by mouth. TAKE PER DIRECTED ON BOX       No current facility-administered medications on file prior to visit.       Concerns today:  No additional concerns today.  Patient is also following up after starting Vraylar, in addition to his Celexa.  He states he is no significant proved meant in his overall mood.  Has motivation and would like to generally improve his overall health.  Is been eating better with this.  Is been more compliant with his medications.    Has been checking blood sugars regularly and  "there has been improvement.  Patient is motivated to start improving physical activity as well.    Lifestyle  Diet: Well Balanced and Diabetic, carb-controlled  Physical Activity: Insufficiently Active (4/10/2023)    Exercise Vital Sign     Days of Exercise per Week: 3 days     Minutes of Exercise per Session: 30 min      Tobacco Use: Unknown (1/8/2024)    Patient History     Smoking Tobacco Use: Never     Smokeless Tobacco Use: Unknown     Passive Exposure: Not on file      Alcohol Use: Not At Risk (4/10/2023)    AUDIT-C     Frequency of Alcohol Consumption: Never     Average Number of Drinks: Patient does not drink     Frequency of Binge Drinking: Never      Depression: Not at risk (1/8/2024)    PHQ-2     PHQ-2 Score: 0      Stress: No Stress Concern Present (4/10/2023)    Citizen of [place] [facility] - Occupational Stress Questionnaire     Feeling of Stress : Not at all       Consultants:  None  Therapy - mood        Colorectal Screening:   colonoscopy  Date: 2022    Nocturia: .Absent  Erectile dysfunction: .Absent    Review of Systems   Constitutional:  Negative for appetite change, fatigue and fever.   HENT:  Negative for ear discharge, ear pain, hearing loss, postnasal drip, rhinorrhea and sinus pain.    Eyes:  Negative for visual disturbance.   Respiratory:  Negative for shortness of breath.    Cardiovascular:  Negative for chest pain, palpitations and leg swelling.   Gastrointestinal:  Negative for abdominal pain, blood in stool, constipation, diarrhea, nausea and vomiting.   Genitourinary:  Negative for flank pain and hematuria.   Musculoskeletal:  Negative for arthralgias and myalgias.   Skin:  Negative for rash.   Neurological:  Negative for dizziness and light-headedness.   All other systems reviewed and are negative.      Objective   /65 (BP Location: Right arm, Patient Position: Sitting)   Pulse 97   Temp 36.5 °C (97.7 °F) (Temporal)   Resp 16   Ht 1.803 m (5' 11\")   Wt 88.5 kg " (195 lb 2 oz)   SpO2 93%   BMI 27.21 kg/m²     Physical Exam  Vitals reviewed.   Constitutional:       General: He is not in acute distress.     Appearance: Normal appearance. He is normal weight. He is not toxic-appearing.   HENT:      Head: Normocephalic and atraumatic.      Right Ear: Tympanic membrane and ear canal normal.      Left Ear: Tympanic membrane and ear canal normal.      Nose: Nose normal. No congestion or rhinorrhea.      Mouth/Throat:      Mouth: Mucous membranes are dry.   Eyes:      General: No scleral icterus.     Extraocular Movements: Extraocular movements intact.      Conjunctiva/sclera: Conjunctivae normal.      Pupils: Pupils are equal, round, and reactive to light.   Cardiovascular:      Rate and Rhythm: Normal rate and regular rhythm.      Heart sounds: No murmur heard.     No friction rub. No gallop.   Pulmonary:      Effort: Pulmonary effort is normal. No respiratory distress.      Breath sounds: Normal breath sounds. No wheezing, rhonchi or rales.   Abdominal:      General: Abdomen is flat. There is no distension.      Palpations: Abdomen is soft.      Tenderness: There is no abdominal tenderness. There is no guarding.   Musculoskeletal:         General: Normal range of motion.      Cervical back: Normal range of motion and neck supple.      Right lower leg: No edema.      Left lower leg: No edema.   Lymphadenopathy:      Cervical: No cervical adenopathy.   Skin:     General: Skin is warm and dry.   Neurological:      General: No focal deficit present.      Mental Status: He is alert and oriented to person, place, and time.   Psychiatric:         Mood and Affect: Mood normal.         Behavior: Behavior normal.         Assessment/Plan   Problem List Items Addressed This Visit       HLD (hyperlipidemia)    Type 2 diabetes mellitus (CMS/HCC)    HTN (hypertension) - Primary     Reviewed Social Determinants of health with patient, discussed healthy lifestyle including 150 minutes of  physical activity per week  Ordered/Reviewed baseline labwork -CBC, CMP, Lipid Panel  Immunizations: Up To Date 1/2 Shingrix  Colonoscopy 2022    Continue with follow-up in 1 month for diabetic evaluation and treatment.  Congratulated him on improving lifestyle.    Follow-up as new concerns arise.    Patient doing very well on Celexa and Vraylar.

## 2024-01-12 ENCOUNTER — TELEPHONE (OUTPATIENT)
Dept: PRIMARY CARE | Facility: CLINIC | Age: 49
End: 2024-01-12
Payer: COMMERCIAL

## 2024-01-12 DIAGNOSIS — M25.859 HIP IMPINGEMENT SYNDROME, UNSPECIFIED LATERALITY: ICD-10-CM

## 2024-01-12 DIAGNOSIS — E11.9 TYPE 2 DIABETES MELLITUS WITHOUT COMPLICATION, WITHOUT LONG-TERM CURRENT USE OF INSULIN (MULTI): ICD-10-CM

## 2024-01-12 DIAGNOSIS — F33.1 MODERATE EPISODE OF RECURRENT MAJOR DEPRESSIVE DISORDER (MULTI): Primary | ICD-10-CM

## 2024-01-12 RX ORDER — MELOXICAM 7.5 MG/1
7.5 TABLET ORAL DAILY
Qty: 90 TABLET | Refills: 3 | Status: SHIPPED | OUTPATIENT
Start: 2024-01-12 | End: 2025-01-11

## 2024-01-12 RX ORDER — CITALOPRAM 40 MG/1
40 TABLET, FILM COATED ORAL DAILY
Qty: 90 TABLET | Refills: 3 | Status: SHIPPED | OUTPATIENT
Start: 2024-01-12 | End: 2024-05-23 | Stop reason: ALTCHOICE

## 2024-01-12 RX ORDER — LISINOPRIL 10 MG/1
10 TABLET ORAL DAILY
Qty: 90 TABLET | Refills: 3 | Status: SHIPPED | OUTPATIENT
Start: 2024-01-12

## 2024-01-12 RX ORDER — LISINOPRIL 10 MG/1
10 TABLET ORAL DAILY
Qty: 90 TABLET | Refills: 3 | Status: SHIPPED | OUTPATIENT
Start: 2024-01-12 | End: 2024-01-12 | Stop reason: SDUPTHER

## 2024-01-12 ASSESSMENT — ENCOUNTER SYMPTOMS
SINUS PAIN: 0
SHORTNESS OF BREATH: 0
RHINORRHEA: 0
VOMITING: 0
PALPITATIONS: 0
LIGHT-HEADEDNESS: 0
BLOOD IN STOOL: 0
CONSTIPATION: 0
APPETITE CHANGE: 0
FEVER: 0
FATIGUE: 0
MYALGIAS: 0
HEMATURIA: 0
DIZZINESS: 0
NAUSEA: 0
FLANK PAIN: 0
DIARRHEA: 0
ABDOMINAL PAIN: 0
ARTHRALGIAS: 0

## 2024-02-05 RX ORDER — METFORMIN HYDROCHLORIDE 500 MG/1
1000 TABLET, EXTENDED RELEASE ORAL 2 TIMES DAILY
Qty: 360 TABLET | Refills: 3 | OUTPATIENT
Start: 2024-02-05

## 2024-02-23 DIAGNOSIS — E11.9 TYPE 2 DIABETES MELLITUS WITHOUT COMPLICATION, WITHOUT LONG-TERM CURRENT USE OF INSULIN (MULTI): Primary | ICD-10-CM

## 2024-02-23 RX ORDER — METFORMIN HYDROCHLORIDE 500 MG/1
1000 TABLET, EXTENDED RELEASE ORAL
Qty: 360 TABLET | Refills: 3 | Status: SHIPPED | OUTPATIENT
Start: 2024-02-23 | End: 2024-05-21 | Stop reason: SDUPTHER

## 2024-03-03 DIAGNOSIS — F33.1 MODERATE EPISODE OF RECURRENT MAJOR DEPRESSIVE DISORDER (MULTI): ICD-10-CM

## 2024-03-04 ENCOUNTER — OFFICE VISIT (OUTPATIENT)
Dept: PRIMARY CARE | Facility: CLINIC | Age: 49
End: 2024-03-04
Payer: COMMERCIAL

## 2024-03-04 VITALS
OXYGEN SATURATION: 95 % | SYSTOLIC BLOOD PRESSURE: 112 MMHG | HEART RATE: 85 BPM | WEIGHT: 200 LBS | BODY MASS INDEX: 27.89 KG/M2 | TEMPERATURE: 97.6 F | DIASTOLIC BLOOD PRESSURE: 76 MMHG | RESPIRATION RATE: 16 BRPM

## 2024-03-04 DIAGNOSIS — E11.9 TYPE 2 DIABETES MELLITUS WITHOUT COMPLICATION, WITHOUT LONG-TERM CURRENT USE OF INSULIN (MULTI): Primary | ICD-10-CM

## 2024-03-04 LAB — POC HEMOGLOBIN A1C: 9.8 % (ref 4.2–6.5)

## 2024-03-04 PROCEDURE — 3078F DIAST BP <80 MM HG: CPT | Performed by: STUDENT IN AN ORGANIZED HEALTH CARE EDUCATION/TRAINING PROGRAM

## 2024-03-04 PROCEDURE — 99214 OFFICE O/P EST MOD 30 MIN: CPT | Performed by: STUDENT IN AN ORGANIZED HEALTH CARE EDUCATION/TRAINING PROGRAM

## 2024-03-04 PROCEDURE — 3074F SYST BP LT 130 MM HG: CPT | Performed by: STUDENT IN AN ORGANIZED HEALTH CARE EDUCATION/TRAINING PROGRAM

## 2024-03-04 PROCEDURE — 83036 HEMOGLOBIN GLYCOSYLATED A1C: CPT | Performed by: STUDENT IN AN ORGANIZED HEALTH CARE EDUCATION/TRAINING PROGRAM

## 2024-03-04 PROCEDURE — 4010F ACE/ARB THERAPY RXD/TAKEN: CPT | Performed by: STUDENT IN AN ORGANIZED HEALTH CARE EDUCATION/TRAINING PROGRAM

## 2024-03-04 RX ORDER — CARIPRAZINE 1.5 MG/1
1.5 CAPSULE, GELATIN COATED ORAL DAILY
Qty: 30 CAPSULE | Refills: 2 | Status: SHIPPED | OUTPATIENT
Start: 2024-03-04 | End: 2024-03-19 | Stop reason: SDUPTHER

## 2024-03-04 RX ORDER — TIRZEPATIDE 2.5 MG/.5ML
2.5 INJECTION, SOLUTION SUBCUTANEOUS
Qty: 2 ML | Refills: 2 | Status: SHIPPED | OUTPATIENT
Start: 2024-03-04 | End: 2024-03-26 | Stop reason: DRUGHIGH

## 2024-03-04 ASSESSMENT — ENCOUNTER SYMPTOMS
DIZZINESS: 0
NAUSEA: 0
POLYPHAGIA: 0
FEVER: 0
FATIGUE: 0
SHORTNESS OF BREATH: 0
LIGHT-HEADEDNESS: 0
POLYDIPSIA: 0
BLURRED VISION: 0
VOMITING: 0
WEIGHT LOSS: 0

## 2024-03-04 NOTE — PROGRESS NOTES
Subjective   Danial Navarro Jr. is a 48 y.o. male who presents for Diabetes and Med Refill (Pt here today for diabetes F/U and refill vraylar).  Diabetes  He presents for his follow-up diabetic visit. He has type 2 diabetes mellitus. His disease course has been worsening. There are no hypoglycemic associated symptoms. Pertinent negatives for hypoglycemia include no dizziness. Pertinent negatives for diabetes include no blurred vision, no chest pain, no fatigue, no foot paresthesias, no polydipsia, no polyphagia and no weight loss. There are no hypoglycemic complications. There are no diabetic complications. Risk factors for coronary artery disease include diabetes mellitus and male sex. Current diabetic treatment includes diet and oral agent (dual therapy). He is compliant with treatment all of the time. His weight is stable. He is following a diabetic and generally healthy diet. When asked about meal planning, he reported none. He participates in exercise daily. An ACE inhibitor/angiotensin II receptor blocker is being taken. He does not see a podiatrist.Eye exam is current.   Med Refill  Pertinent negatives include no chest pain, fatigue, fever, nausea or vomiting.   Mood is improving on Celexa and Vraylar.  Patient would like to continue at this time.  No suicidal homicidal ideations.  Mood overall better than it has been in the past.    Review of Systems   Constitutional:  Negative for fatigue, fever and weight loss.   Eyes:  Negative for blurred vision.   Respiratory:  Negative for shortness of breath.    Cardiovascular:  Negative for chest pain.   Gastrointestinal:  Negative for nausea and vomiting.   Endocrine: Negative for polydipsia and polyphagia.   Neurological:  Negative for dizziness and light-headedness.   All other systems reviewed and are negative.      Objective   Physical Exam  Vitals reviewed.   Constitutional:       General: He is not in acute distress.     Appearance: Normal appearance. He is not  toxic-appearing.   HENT:      Head: Normocephalic and atraumatic.      Nose: Nose normal.   Eyes:      Extraocular Movements: Extraocular movements intact.   Cardiovascular:      Rate and Rhythm: Normal rate and regular rhythm.      Heart sounds: No murmur heard.     No friction rub. No gallop.   Pulmonary:      Effort: Pulmonary effort is normal. No respiratory distress.      Breath sounds: Normal breath sounds. No wheezing, rhonchi or rales.   Skin:     General: Skin is warm and dry.   Neurological:      General: No focal deficit present.      Mental Status: He is alert.   Psychiatric:         Mood and Affect: Mood normal.         Behavior: Behavior normal.         Assessment/Plan   Problem List Items Addressed This Visit       Type 2 diabetes mellitus (CMS/HCC) - Primary    Relevant Medications    tirzepatide (Mounjaro) 2.5 mg/0.5 mL pen injector    Other Relevant Orders    POCT glycosylated hemoglobin (Hb A1C) manually resulted (Completed)    Follow Up In [facility] - Pharmacy     Lab Results   Component Value Date    HGBA1C 9.8 (A) 03/04/2024     Continue treatment including: Metformin, Sulfonylurea, and GLP-1/terzepatide  Complications include: none  Continue to monitor blood sugars  Continue with follow ups including:   Ophthalmology: Up-to-date  Podiatry: Not Indicated  Cardiology: Not Indicated  Discussed healthy, low carb diet and trying to get 150 minutes of physical activity per week.  Follow up in 3 months or sooner if new concerns arise     Continue metformin glipizide.    We will put in for Mounjaro, given coupon for this.  Will put a referral to [facility] pharmacy to see what is affordable and realistic.  Follow-up in 1 to 3 months depending on medication availability and adherence.  Follow-up as new concerns arise.

## 2024-03-19 ENCOUNTER — TELEMEDICINE (OUTPATIENT)
Dept: PHARMACY | Facility: HOSPITAL | Age: 49
End: 2024-03-19
Payer: COMMERCIAL

## 2024-03-19 DIAGNOSIS — E11.9 TYPE 2 DIABETES MELLITUS WITHOUT COMPLICATION, WITHOUT LONG-TERM CURRENT USE OF INSULIN (MULTI): ICD-10-CM

## 2024-03-19 DIAGNOSIS — F33.1 MODERATE EPISODE OF RECURRENT MAJOR DEPRESSIVE DISORDER (MULTI): ICD-10-CM

## 2024-03-19 NOTE — PROGRESS NOTES
Pharmacist Clinic: Diabetes Management  Danial Navarro Jr. is a 48 y.o. male was referred to Clinical Pharmacy Team for diabetes management.   Referring Provider: Akbar Youssef DO     Patient reports medication cost difficulties due to high deductible and no coverage for his medications. Vraylar and citalopram had been effective for him, but he is unable to afford Vraylar. He reports noncompliance to meds in the past due to his depression. He now has a psych referral and is working on his depression and his health.     A comprehensive medication review was completed with the patient.     MEDICATION RECONCILIATION  Added: none  Removed:   Accuchek Guide test strips (using different test strips)  sod sulf-pot chloride-mag sulf (Sutab) 1.479-0.188- 0.225 gram tablet (patient doesn't know what it is)  Patient reports:  Mounjaro 2.5mg samples  Vraylar 1.5mg capsules (hasn't been able to take due to cost. Not covered by insurance, but can fill with copay card on commercial insurance)    Current Outpatient Medications on File Prior to Visit   Medication Sig Dispense Refill    atorvastatin (Lipitor) 40 mg tablet Take 1 tablet (40 mg) by mouth once daily. 90 tablet 3    citalopram (CeleXA) 40 mg tablet Take 1 tablet (40 mg) by mouth once daily. 90 tablet 3    glimepiride (Amaryl) 2 mg tablet TAKE ONE AND ONE-HALF TABLETS DAILY 135 tablet 3    lisinopril 10 mg tablet Take 1 tablet (10 mg) by mouth once daily. 90 tablet 3    meloxicam (Mobic) 7.5 mg tablet Take 1 tablet (7.5 mg) by mouth once daily. 90 tablet 3    metFORMIN  mg 24 hr tablet Take 2 tablets (1,000 mg) by mouth 2 times a day with meals. 2 TABLETS TWICE DIALY 360 tablet 3    tirzepatide (Mounjaro) 2.5 mg/0.5 mL pen injector Inject 2.5 mg under the skin 1 (one) time per week. 2 mL 2    [DISCONTINUED] blood sugar diagnostic (Accu-Chek Guide test strips) strip Per directed      [DISCONTINUED] sod sulf-pot chloride-mag sulf (Sutab) 1.479-0.188- 0.225 gram tablet  Take by mouth. TAKE PER DIRECTED ON BOX      [DISCONTINUED] Vraylar 1.5 mg capsule Take 1 capsule (1.5 mg) by mouth once daily. (Patient not taking: Reported on 3/19/2024) 30 capsule 2     No current facility-administered medications on file prior to visit.      Allergies   Allergen Reactions    Penicillins Unknown     Almost 50 years ago       [facility] - [place], [place] - [address]  [address] [facility] [address]  Phone: [phone] Fax: [phone]    [facility] - [place], [place] - [address]  [address] [facility] [address]  Phone: [phone] Fax: [phone]    [facility]  [address], [address]  [facility] [address]  Phone: [phone] Fax: [phone]       No issues reported in regards to adverse effects organization.    Diabetes  He presents for his initial diabetic visit. He has type 2 diabetes mellitus. His disease course has been improving (with compliance to metformin and Mounjaro). His home blood glucose trend is decreasing steadily.     HISTORY OF PRESENT ILLNESS  Approximate Date of Diagnosis: 10 years   Known complications due to diabetes included none    Diabetes    SMBG Measurements  Patient is using: glucometer [employer]   The patient is currently checking the blood glucose 2-3 times per day.  Patient does not report symptoms of hypoglycemia. Patient reports none.  Hypoglycemia frequency: none  Hypoglycemia awareness: No   Patient does report symptoms of hyperglycemia. Patient reports blurry vision, excessive thirst, fatigue, and polyuria.  FBG (avg) 120-140, 107 (3/19/24)   PPBG (avg): 140-170s  Before Mounjaro, -300  Had been off Metformin led to hyperglycemia    Diet: fast food (apples, vegetables/salads), but patient is working on watching his diet.  Fluids: 1 bottle of water/day    Physical Activity   Not much     Pertinent PMH Review:  PMH of Pancreatitis: No  PMH of Retinopathy: No  PMH of  Urinary Tract Infections: No  PMH of MTC: No    LAB REVIEW   Lab Results   Component Value Date    CREATININE 0.95 04/14/2023      Lab Results   Component Value Date    HGBA1C 9.8 (A) 03/04/2024    HGBA1C 9.3 (A) 11/02/2023    HGBA1C 8.1 (A) 04/10/2023     Lab Results   Component Value Date    TRIG 208 (H) 04/14/2023    TRIG 384 (H) 01/10/2022    TRIG 216 (H) 09/17/2020       Secondary Prevention  Statin? Yes  ACE-I/ARB? Yes  Aspirin? No  Last eye exam: 2 years  Last diabetic foot exam:     The 10-year ASCVD risk score (Kate CONRAD, et al., 2019) is: 5.3%    Values used to calculate the score:      Age: 48 years      Sex: Male      Is Non- : No      Diabetic: Yes      Tobacco smoker: No      Systolic Blood Pressure: 112 mmHg      Is BP treated: Yes      HDL Cholesterol: 35 mg/dL      Total Cholesterol: 138 mg/dL     DIABETES ASSESSMENT    Current Diabetes Medications:   Metformin ER 500mg - 2 tabs (1000mg) BID with meals (noticed his BG was elevated when he was noncompliant)  Mounjaro 2.5mg weekly (given samples and is on the 3rd dose)  Glimepiride 2mg - 1.5 tab QD (newer med)    Historical Diabetes Medications:  Janumet (edil)     AFFORDABILITY/ADHERENCE   - Vraylar is not covered on his insurance plan but he does have a copay card  - Caresens test strips 50 ct $12.99/month (never checked if test supplies are covered, but likely not covered under insurance right now)  - Has high deductible (patient initially reported $19580), so none of his medications are currently covered. He is eligible to use copay cards for his commercial insurance    Assessment/Plan   Problem List Items Addressed This Visit       Depression    Relevant Medications    cariprazine (Vraylar) 1.5 mg capsule    Type 2 diabetes mellitus (CMS/[facility])     Patients diabetes needs improvement with most recent A1c of 9.8% on 3/4/24 (Goal < 7%). Patient actually has a $7500 deductible that needs to be met before any of his  prescriptions are covered at all. On samples of Mounjaro 2.5mg which he reports to be very effective with his BG control along with metformin. He is uncertain about glimepiride but has not yet experienced hypoglycemia. He would like to stay on Mounjaro which is on the formulary but is not covered yet. Vraylar with copay savings card may help patient meet the deductible within a few months, but will look into alternatives to help him save money.  Continue:   Metformin ER 500mg - 2 tabs (1000mg) BID with meals   Mounjaro 2.5mg weekly (possible titration at follow-up depending on remaining deductible)  Glimepiride 2mg - 1.5 tab QD (will consider stopping upon titration of GLP-1  Sent new script of Vraylar to [facility] pharmacy for patient to obtain copay benefits   Adherence at present is estimated to be good. Efforts to improve adherence (if necessary) will be directed at dietary modifications: Less portions, reduce carbs and increased protein and vegetables. , increased exercise, and regular blood sugar monitorin-3 times daily.  Education Provided to Patient: MOA of GLP-1, SGLT2 inhibitors, copay cards, insurance deductibles and copay cards, and BG goals  PharmD Follow-up: 1 week         Relevant Orders    Follow Up In Advanced Primary Care - Pharmacy       Notify your healthcare provider if you have any of the following:  -Diarrhea or vomiting for more than six hours  -Blood sugar >300 mg/dL more than once  -Low blood sugar (<70 mg/dL)  -Questions about your medications    Thank you,  Carrie Hebert, PharmD    Continue all meds under the continuation of care with the referring provider and clinical pharmacy team.  Verbal consent to manage patient's drug therapy was obtained. They were informed they may decline to participate or withdraw from participation in pharmacy services at any time.

## 2024-03-20 PROCEDURE — RXMED WILLOW AMBULATORY MEDICATION CHARGE

## 2024-03-20 NOTE — PROGRESS NOTES
I reviewed the progress note and agree with the resident’s findings and plans as written. Case discussed with resident.    Luis Oshea, PharmD

## 2024-03-20 NOTE — ASSESSMENT & PLAN NOTE
Patients diabetes needs improvement with most recent A1c of 9.8% on 3/4/24 (Goal < 7%). Patient actually has a $7500 deductible that needs to be met before any of his prescriptions are covered at all. On samples of Mounjaro 2.5mg which he reports to be very effective with his BG control along with metformin. He is uncertain about glimepiride but has not yet experienced hypoglycemia. He would like to stay on Mounjaro which is on the formulary but is not covered yet. Vraylar with copay savings card may help patient meet the deductible within a few months, but will look into alternatives to help him save money.  Continue:   Metformin ER 500mg - 2 tabs (1000mg) BID with meals   Mounjaro 2.5mg weekly (possible titration at follow-up depending on remaining deductible)  Glimepiride 2mg - 1.5 tab QD (will consider stopping upon titration of GLP-1  Sent new script of Vraylar to [facility] pharmacy for patient to obtain copay benefits   Adherence at present is estimated to be good. Efforts to improve adherence (if necessary) will be directed at dietary modifications: Less portions, reduce carbs and increased protein and vegetables. , increased exercise, and regular blood sugar monitorin-3 times daily.  Education Provided to Patient: MOA of GLP-1, SGLT2 inhibitors, copay cards, insurance deductibles and copay cards, and BG goals  PharmD Follow-up: 1 week

## 2024-03-21 ENCOUNTER — PHARMACY VISIT (OUTPATIENT)
Dept: PHARMACY | Facility: CLINIC | Age: 49
End: 2024-03-21
Payer: COMMERCIAL

## 2024-03-26 ENCOUNTER — TELEMEDICINE (OUTPATIENT)
Dept: PHARMACY | Facility: HOSPITAL | Age: 49
End: 2024-03-26
Payer: COMMERCIAL

## 2024-03-26 DIAGNOSIS — E11.9 TYPE 2 DIABETES MELLITUS WITHOUT COMPLICATION, WITHOUT LONG-TERM CURRENT USE OF INSULIN (MULTI): ICD-10-CM

## 2024-03-26 PROCEDURE — RXMED WILLOW AMBULATORY MEDICATION CHARGE

## 2024-03-26 RX ORDER — TIRZEPATIDE 5 MG/.5ML
5 INJECTION, SOLUTION SUBCUTANEOUS
Qty: 2 ML | Refills: 1 | Status: SHIPPED | OUTPATIENT
Start: 2024-03-26 | End: 2024-04-23 | Stop reason: DRUGHIGH

## 2024-03-26 NOTE — ASSESSMENT & PLAN NOTE
Patients diabetes needs improvement with most recent A1c of 9.8% on 3/4/24 (Goal < 7%). Patient actually has a $7500 deductible that needs to be met before any of his prescriptions are covered at all. On samples of Mounjaro 2.5mg which he reports to be very effective with his BG control along with metformin. Currently, his BG seems variable but much better than it was in the past. Increase in Mounjaro and holding glimepiride may help stabilize his BG more. Vraylar and Mounjaro with copay savings card will help patient meet the deductible within a few months in an affordable way.  Continue: Metformin ER 500mg - 2 tabs (1000mg) BID with meals   Start: Mounjaro 5mg weekly (currently Mounjaro is on backorder but 0.5mg is available at [facility])  Hold upon starting Mounjaro 5mg: Glimepiride 2mg - 1.5 tab QD (will consider stopping upon titration of GLP-1 depending on BG levels)  Adherence at present is estimated to be good. Efforts to improve adherence (if necessary) will be directed at dietary modifications: Less portions, reduce carbs and increased protein and vegetables. , increased exercise, and regular blood sugar monitorin-3 times daily.  Education Provided to Patient: MOA of GLP-1, SGLT2 inhibitors, insurance deductible and copay cards, and BG goals  PharmD Follow-up: 2 weeks to check BG control and make adjustments

## 2024-03-26 NOTE — PROGRESS NOTES
"Pharmacist Clinic: Diabetes Management  Danial Navarro Jr. is a 48 y.o. male was referred to Clinical Pharmacy Team for diabetes management.   Referring Provider: Akbar Youssef DO     Patient was able restart Vraylar after our arrangements made for med cost assistance. He is tolerating it well without side effects so far. Vraylar and citalopram had been effective for him in the past so he will get re-evaluated for the his treatmen from [facility].     He just finished his last dose of Mounjaro 2.5mg without side effects. Reports that it has been \"life-changing\" for his diabetes control as his numbers are stable below 200 now along with metformin ER and glimepiride. Currently adherent with his meds.    Current Outpatient Medications on File Prior to Visit   Medication Sig Dispense Refill    atorvastatin (Lipitor) 40 mg tablet Take 1 tablet (40 mg) by mouth once daily. 90 tablet 3    cariprazine (Vraylar) 1.5 mg capsule Take 1 capsule (1.5 mg) by mouth once daily. 90 capsule 0    citalopram (CeleXA) 40 mg tablet Take 1 tablet (40 mg) by mouth once daily. 90 tablet 3    glimepiride (Amaryl) 2 mg tablet TAKE ONE AND ONE-HALF TABLETS DAILY 135 tablet 3    lisinopril 10 mg tablet Take 1 tablet (10 mg) by mouth once daily. 90 tablet 3    meloxicam (Mobic) 7.5 mg tablet Take 1 tablet (7.5 mg) by mouth once daily. 90 tablet 3    metFORMIN  mg 24 hr tablet Take 2 tablets (1,000 mg) by mouth 2 times a day with meals. 2 TABLETS TWICE DIALY 360 tablet 3    [DISCONTINUED] tirzepatide (Mounjaro) 2.5 mg/0.5 mL pen injector Inject 2.5 mg under the skin 1 (one) time per week. 2 mL 2     No current facility-administered medications on file prior to visit.      Allergies   Allergen Reactions    Penicillins Unknown     Almost 50 years ago       [facility] - [place], [place] - [address]  [address]  [facility] [address]  Phone: [phone] Fax: [phone]    [facility] - [place], [place] - [facility]  [address] [address]  Phone: [phone] Fax: [phone]    [facility]  [address]  [facility] [address]  Phone: [phone] Fax: [phone]       No issues reported in regards to adverse effects organization.    Diabetes  He presents for his initial diabetic visit. He has type 2 diabetes mellitus. His disease course has been improving (with compliance to metformin and Mounjaro). His home blood glucose trend is decreasing steadily.     HISTORY OF PRESENT ILLNESS  Approximate Date of Diagnosis: 10 years   Known complications due to diabetes included none    Diabetes    SMBG Measurements  Patient is using: glucometer [employer]   The patient is currently checking the blood glucose 2-3 times per day.  Patient does not report symptoms of hypoglycemia. Patient reports none.  Hypoglycemia frequency: none, had 89 mg/dL in early afternoon but no symptoms  Hypoglycemia awareness: No   Patient does report symptoms of hyperglycemia. Patient reports blurry vision, excessive thirst, fatigue, and polyuria. He has not noticed symptoms with   FB today, 110-150, 110 (lowest), 170 (highest)   PPBG (avg 2 hrs after meals): 140-180s, highest 188, lowest 89  Before Mounjaro, BG was 250-300    Diet: fast food (apples, vegetables/salads), but patient is working on watching his diet.  Fluids: 1 bottle of water/day    Physical Activity   Not much. Has a gym and very nice sight-seeing location at [facility]. May consider walking every night with wife    Pertinent PMH Review:  PMH of Pancreatitis: No  PMH of Retinopathy: No  PMH of Urinary Tract Infections: No  PMH of MTC: No    LAB REVIEW   Lab Results   Component Value Date    CREATININE 0.95 2023      Lab Results   Component Value Date    HGBA1C 9.8 (A) 2024    HGBA1C 9.3 (A) 2023    HGBA1C 8.1 (A) 04/10/2023     Lab Results   Component Value Date    TRIG 208 (H) 2023    TRIG 384 (H)  01/10/2022    TRIG 216 (H) 09/17/2020       Secondary Prevention  Statin? Yes  ACE-I/ARB? Yes  Aspirin? No  Last eye exam: 2 years  Last diabetic foot exam:     The 10-year ASCVD risk score (Kate CONRAD, et al., 2019) is: 5.3%    Values used to calculate the score:      Age: 48 years      Sex: Male      Is Non- : No      Diabetic: Yes      Tobacco smoker: No      Systolic Blood Pressure: 112 mmHg      Is BP treated: Yes      HDL Cholesterol: 35 mg/dL      Total Cholesterol: 138 mg/dL     DIABETES ASSESSMENT    Current Diabetes Medications:   Metformin ER 500mg - 2 tabs (1000mg) BID with meals (noticed his BG was elevated when he was noncompliant)  Mounjaro 2.5mg weekly on Mon (given samples and finished 4th dose)  Glimepiride 2mg - 1.5 tab QD (newer med)    Historical Diabetes Medications:  Janumet (rashes)   Ozempic (major GI effects but had accidentally started from 0.5mg)    AFFORDABILITY/ADHERENCE   - Vraylar is not covered on his insurance plan but he does have a copay card  - [email or web address] test strips 50 ct $12.99/month (never checked if test supplies are covered, but likely not covered under insurance right now)  - Has high deductible (patient initially reported $7500), so none of his medications are currently covered. He is eligible to use copay cards for his commercial insurance.     Assessment/Plan   Problem List Items Addressed This Visit       Type 2 diabetes mellitus (CMS/[facility])     Patients diabetes needs improvement with most recent A1c of 9.8% on 3/4/24 (Goal < 7%). Patient actually has a $7500 deductible that needs to be met before any of his prescriptions are covered at all. On samples of Mounjaro 2.5mg which he reports to be very effective with his BG control along with metformin. Currently, his BG seems variable but much better than it was in the past. Increase in Mounjaro and holding glimepiride may help stabilize his BG more. Vraylar and Mounjaro with copay savings card will help  patient meet the deductible within a few months in an affordable way.  Continue: Metformin ER 500mg - 2 tabs (1000mg) BID with meals   Start: Mounjaro 5mg weekly (currently Mounjaro is on backorder but 0.5mg is available at [place])  Hold upon starting Mounjaro 5mg: Glimepiride 2mg - 1.5 tab QD (will consider stopping upon titration of GLP-1 depending on BG levels)  Adherence at present is estimated to be good. Efforts to improve adherence (if necessary) will be directed at dietary modifications: Less portions, reduce carbs and increased protein and vegetables. , increased exercise, and regular blood sugar monitorin-3 times daily.  Education Provided to Patient: MOA of GLP-1, SGLT2 inhibitors, insurance deductible and copay cards, and BG goals  PharmD Follow-up: 2 weeks to check BG control and make adjustments         Relevant Medications    tirzepatide (Mounjaro) 5 mg/0.5 mL pen injector    Other Relevant Orders    Follow Up In Advanced Primary Care - Pharmacy       Notify your healthcare provider if you have any of the following:  -Diarrhea or vomiting for more than six hours  -Blood sugar >300 mg/dL more than once  -Low blood sugar (<70 mg/dL)  -Questions about your medications    Thank you,  Carrie Hebert, PharmD    Continue all meds under the continuation of care with the referring provider and clinical pharmacy team.  Verbal consent to manage patient's drug therapy was obtained. They were informed they may decline to participate or withdraw from participation in pharmacy services at any time.

## 2024-03-29 ENCOUNTER — PHARMACY VISIT (OUTPATIENT)
Dept: PHARMACY | Facility: CLINIC | Age: 49
End: 2024-03-29
Payer: COMMERCIAL

## 2024-04-09 ENCOUNTER — TELEMEDICINE (OUTPATIENT)
Dept: PHARMACY | Facility: HOSPITAL | Age: 49
End: 2024-04-09
Payer: COMMERCIAL

## 2024-04-09 DIAGNOSIS — E11.9 TYPE 2 DIABETES MELLITUS WITHOUT COMPLICATION, WITHOUT LONG-TERM CURRENT USE OF INSULIN (MULTI): ICD-10-CM

## 2024-04-09 NOTE — PROGRESS NOTES
"Pharmacist Clinic: Diabetes Management  Danial Navarro Jr. is a 48 y.o. male was referred to Clinical Pharmacy Team for diabetes management.   Referring Provider: Akbar Youssef DO     Patient was able restart Vraylar after our arrangements made for med cost assistance. He is tolerating it well without side effects so far. Vraylar and citalopram had been effective for him in the past so he will get re-evaluated for the his treatmen from [facility]. He reports 2 weeks on Vraylar and is still struggling with tiredness and motivation. Starting therapy with psych soon.    He just finished his last dose of Mounjaro 2.5mg without side effects. Reports that it has been \"life-changing\" for his diabetes control as his numbers are stable below 200 now along with metformin ER. Currently adherent with his meds.    Current Outpatient Medications on File Prior to Visit   Medication Sig Dispense Refill    atorvastatin (Lipitor) 40 mg tablet Take 1 tablet (40 mg) by mouth once daily. 90 tablet 3    cariprazine (Vraylar) 1.5 mg capsule Take 1 capsule (1.5 mg) by mouth once daily. 90 capsule 0    citalopram (CeleXA) 40 mg tablet Take 1 tablet (40 mg) by mouth once daily. 90 tablet 3    glimepiride (Amaryl) 2 mg tablet TAKE ONE AND ONE-HALF TABLETS DAILY 135 tablet 3    lisinopril 10 mg tablet Take 1 tablet (10 mg) by mouth once daily. 90 tablet 3    meloxicam (Mobic) 7.5 mg tablet Take 1 tablet (7.5 mg) by mouth once daily. 90 tablet 3    metFORMIN  mg 24 hr tablet Take 2 tablets (1,000 mg) by mouth 2 times a day with meals. 2 TABLETS TWICE DIALY 360 tablet 3    tirzepatide (Mounjaro) 5 mg/0.5 mL pen injector Inject 5 mg under the skin 1 (one) time per week. 2 mL 1     No current facility-administered medications on file prior to visit.      Allergies   Allergen Reactions    Penicillins Unknown     Almost 50 years ago       [facility] - [place], [place] - [address]  [address]  [facility] [address]  Phone: " [phone] Fax: [phone]    [facility] - [place], [place] - [address]  [address] [facility] [address]  Phone: [phone] Fax: [phone]    [facility]  [address]  [facility] [address]  Phone: [phone] Fax: [phone]       No issues reported in regards to adverse effects organization.    Diabetes  He presents for his initial diabetic visit. He has type 2 diabetes mellitus. His disease course has been improving (with compliance to metformin and Mounjaro). His home blood glucose trend is decreasing steadily.     HISTORY OF PRESENT ILLNESS  Approximate Date of Diagnosis: 10 years   Known complications due to diabetes included none    Diabetes    SMBG Measurements  Patient is using: glucometer CaresensN   The patient is currently checking the blood glucose 3-4 times per day.  Patient does not report symptoms of hypoglycemia. Patient reports none.  Hypoglycemia frequency: none, had 89 mg/dL in early afternoon but no symptoms  Hypoglycemia awareness: No   Patient does not report symptoms of hyperglycemia. Patient previously reports blurry vision, excessive thirst, fatigue, and polyuria. He has not noticed symptoms with   FB (24), reported 156, 139, 145, 117, 173  PPBG (avg 2 hrs after meals): 156, 154, 110-150s    Before Mounjaro, BG was 250-300    Diet: fast food (apples, vegetables/salads), but patient is working on watching his diet.  Snacks: often at night  Fluids: 1 bottle of water/day    Physical Activity   Not much. Has a gym and very nice sight-seeing location at [facility]. May consider walking every night with wife    Pertinent PMH Review:  PMH of Pancreatitis: No  PMH of Retinopathy: No  PMH of Urinary Tract Infections: No  PMH of MTC: No    LAB REVIEW   Lab Results   Component Value Date    CREATININE 0.95 2023      Lab Results   Component Value Date    HGBA1C 9.8 (A) 2024    HGBA1C 9.3 (A) 2023     HGBA1C 8.1 (A) 04/10/2023     Lab Results   Component Value Date    TRIG 208 (H) 04/14/2023    TRIG 384 (H) 01/10/2022    TRIG 216 (H) 09/17/2020       Secondary Prevention  Statin? Yes  ACE-I/ARB? Yes  Aspirin? No  Last eye exam: 2 years  Last diabetic foot exam:     The 10-year ASCVD risk score (Kate CONRAD, et al., 2019) is: 5.3%    Values used to calculate the score:      Age: 48 years      Sex: Male      Is Non- : No      Diabetic: Yes      Tobacco smoker: No      Systolic Blood Pressure: 112 mmHg      Is BP treated: Yes      HDL Cholesterol: 35 mg/dL      Total Cholesterol: 138 mg/dL     DIABETES ASSESSMENT    Current Diabetes Medications:   Metformin ER 500mg - 2 tabs (1000mg) BID with meals (noticed his BG was elevated when he was noncompliant)  Mounjaro 5mg weekly on Mon (on the 2nd dose)    Historical Diabetes Medications:  Janumet (rashes)   Ozempic (major GI effects but had accidentally started from 0.5mg)    AFFORDABILITY/ADHERENCE - Has high deductible (patient initially reported $7500), so none of his medications are currently covered. He is eligible to use copay cards for his commercial insurance.   Vraylar, Mounjaro  Test supplies not covered under insurance and don't go towards deductible. Pays for OTC Caresens test strips 50 ct $12.99/month and other tet supplies $7-10/month. Rx at [facility]:  Accuchek test strips 100ct: $46.35  Lancets 100ct: $6.31  Lancet (accuchek softclix 100ct): $14.42    Assessment/Plan   Problem List Items Addressed This Visit       Type 2 diabetes mellitus (Multi)     Patients diabetes needs improvement with most recent A1c of 9.8%  on 3/4/24 (Goal < 7%). Patient's $7500 needs to be met before cost of Mounjaro and Vraylar will be affordable. He is currently benefiting from e-vouchers to go towards deductible at [facility]. PPBG is stable but FBG is elevated likely due to snacking overnight with Mounjaro 5mg and Metformin ER 1000mg BID.  Continue:    Continue:   Metformin ER 500mg - 2 tabs (1000mg) BID with meals   Mounjaro 5mg weekly (4/1/24-4/22/24)  Will initiate 7.5mg at follow-up due to cost. Copay may be higher due to limited remaining funds on current e-voucher  Holding Glimepiride 2mg - 1.5 tab QD (will consider stopping upon titration of GLP-1 depending on BG levels)  Compliance at present is estimated to be good. Efforts to improve compliance (if necessary) will be directed at dietary modifications: less snacking overnight, increased exercise, and regular blood sugar monitoring: 3-4 times daily with tracking of FBG and BG before biggest meal and 2 hours after biggest meal.  Follow-up: 4/23/24 @ 9am for BG levels and titration  PCP Follow-up: n/a         Relevant Orders    Follow Up In Advanced Primary Care - Pharmacy       Notify your healthcare provider if you have any of the following:  -Diarrhea or vomiting for more than six hours  -Blood sugar >300 mg/dL more than once  -Low blood sugar (<70 mg/dL)  -Questions about your medications    Thank you,  Carrie Hebert, PharmD    Continue all meds under the continuation of care with the referring provider and clinical pharmacy team.  Verbal consent to manage patient's drug therapy was obtained. They were informed they may decline to participate or withdraw from participation in pharmacy services at any time.

## 2024-04-11 PROCEDURE — RXMED WILLOW AMBULATORY MEDICATION CHARGE

## 2024-04-14 NOTE — ASSESSMENT & PLAN NOTE
Patients diabetes needs improvement with most recent A1c of 9.8%  on 3/4/24 (Goal < 7%). Patient's $7500 needs to be met before cost of Mounjaro and Vraylar will be affordable. He is currently benefiting from e-vouchers to go towards deductible at [facility]. PPBG is stable but FBG is elevated likely due to snacking overnight with Mounjaro 5mg and Metformin ER 1000mg BID.  Continue:   Continue:   Metformin ER 500mg - 2 tabs (1000mg) BID with meals   Mounjaro 5mg weekly (4/1/24-4/22/24)  Will initiate 7.5mg at follow-up due to cost. Copay may be higher due to limited remaining funds on current e-voucher  Holding Glimepiride 2mg - 1.5 tab QD (will consider stopping upon titration of GLP-1 depending on BG levels)  Compliance at present is estimated to be good. Efforts to improve compliance (if necessary) will be directed at dietary modifications: less snacking overnight, increased exercise, and regular blood sugar monitoring: 3-4 times daily with tracking of FBG and BG before biggest meal and 2 hours after biggest meal.  Follow-up: 4/23/24 @ 9am for BG levels and titration  PCP Follow-up: n/a

## 2024-04-15 ENCOUNTER — PHARMACY VISIT (OUTPATIENT)
Dept: PHARMACY | Facility: CLINIC | Age: 49
End: 2024-04-15
Payer: COMMERCIAL

## 2024-04-16 ENCOUNTER — APPOINTMENT (OUTPATIENT)
Dept: PHARMACY | Facility: HOSPITAL | Age: 49
End: 2024-04-16
Payer: COMMERCIAL

## 2024-04-23 ENCOUNTER — TELEMEDICINE (OUTPATIENT)
Dept: PHARMACY | Facility: HOSPITAL | Age: 49
End: 2024-04-23
Payer: COMMERCIAL

## 2024-04-23 DIAGNOSIS — E11.9 TYPE 2 DIABETES MELLITUS WITHOUT COMPLICATION, WITHOUT LONG-TERM CURRENT USE OF INSULIN (MULTI): ICD-10-CM

## 2024-04-23 PROCEDURE — RXMED WILLOW AMBULATORY MEDICATION CHARGE

## 2024-04-23 RX ORDER — TIRZEPATIDE 7.5 MG/.5ML
7.5 INJECTION, SOLUTION SUBCUTANEOUS
Qty: 2 ML | Refills: 1 | Status: SHIPPED | OUTPATIENT
Start: 2024-04-28 | End: 2024-05-21 | Stop reason: DRUGHIGH

## 2024-04-23 RX ORDER — VILAZODONE HYDROCHLORIDE 20 MG/1
20 TABLET ORAL DAILY
COMMUNITY

## 2024-04-23 NOTE — ASSESSMENT & PLAN NOTE
Patients diabetes needs improvement with most recent A1c of 9.8%  on 3/4/24 (Goal < 7%). Overall BG is stable with Mounjaro 5mg and Metformin ER 1000mg BID, but FBG is elevated likely due to snacking overnight. His goal is to eventually be off metformin.  Start: Mounjaro 7.5mg weekly (4/29/24-5/20/24)  Patient is aware of $180 copay as result of e-voucher. Consider using new coupon for future fills  Continue: Metformin ER 500mg - 2 tabs (1000mg) BID with meals   If experiencing hypoglycemia too much, decrease to 1 tab every morning and 2 tabs every evening  Discontinue: Glimepiride 2mg - 1.5 tab QD (will consider stopping upon titration of GLP-1 depending on BG levels)  Compliance at present is estimated to be good. Efforts to improve compliance (if necessary) will be directed at:   dietary modifications: less snacking overnight,   increased exercise: walking more (after dinner)  regular blood sugar monitoring: 3-4 times daily with tracking of FBG and BG before biggest meal and 2 hours after biggest meal  Follow-up: 5/21 @ 9am for BG levels and Mounjaro titration/metformin taper if tolerated well and no back order issues. Patient has my contact [phone] for any concerns

## 2024-04-23 NOTE — PATIENT INSTRUCTIONS
Patient Blood Sugar Goals  - Fasting blood sugar (before eating, at least 8 hours fasting): 70 - 130 mg/dL  - Postprandial blood sugar (throughout the day after eating): less than 180 mg/dL  - A1c: less than 7%    Watch for signs and symptoms of low/high blood sugar:   Low BG (below goal) High BG (above goal)   Early symptoms Looking pale (pallor)  Shakiness  Dizziness or lightheadedness  Sweating  Hunger or nausea  An irregular or fast heartbeat  Difficulty concentrating  Fatigue: feeling weak and having no energy  Irritability or anxiety  Headache  Tingling or numbness of the lips, tongue or cheek Greatly increased urination  Thirst  Extreme hunger  Extreme fatigue  Confusion  Blurred vision/visual changes  Infections (urinary/skin)   Severe Confusion, unusual behavior or both, such as the inability to complete routine tasks  Loss of coordination  Difficulty speaking or slurred speech  Blurry or tunnel vision  Inability to eat or drink  Muscle weakness  Drowsiness  Seizures  Coma Shortness of breath  Fruity-smelling breath  Nausea and vomiting  Abdominal pain  Very dry mouth  Seizures  Loss of consciousness/Coma

## 2024-04-23 NOTE — PROGRESS NOTES
Pharmacist Clinic: Diabetes Management  Danial Navarro Jr. is a 48 y.o. male was referred to Clinical Pharmacy Team for diabetes management.   Referring Provider: Akbar Youssef DO     Patient was able restart Vraylar after our arrangements made for med cost assistance. He is tolerating it well without side effects so far. Vraylar and citalopram had been effective for him in the past so he will get re-evaluated for the his treatmen from [facility]. Recently visited psych who is tapering/stopping citalopram and started vilazodone 20mg, which is affordable for him.    Current Outpatient Medications on File Prior to Visit   Medication Sig Dispense Refill    atorvastatin (Lipitor) 40 mg tablet Take 1 tablet (40 mg) by mouth once daily. 90 tablet 3    cariprazine (Vraylar) 1.5 mg capsule Take 1 capsule (1.5 mg) by mouth once daily. 90 capsule 0    lisinopril 10 mg tablet Take 1 tablet (10 mg) by mouth once daily. 90 tablet 3    meloxicam (Mobic) 7.5 mg tablet Take 1 tablet (7.5 mg) by mouth once daily. 90 tablet 3    metFORMIN  mg 24 hr tablet Take 2 tablets (1,000 mg) by mouth 2 times a day with meals. 2 TABLETS TWICE DIALY 360 tablet 3    vilazodone (Viibryd) 20 mg tablet Take 1 tablet (20 mg) by mouth once daily.      citalopram (CeleXA) 40 mg tablet Take 1 tablet (40 mg) by mouth once daily. (Patient not taking: Reported on 4/23/2024) 90 tablet 3    [DISCONTINUED] glimepiride (Amaryl) 2 mg tablet TAKE ONE AND ONE-HALF TABLETS DAILY (Patient not taking: Reported on 4/23/2024) 135 tablet 3    [DISCONTINUED] tirzepatide (Mounjaro) 5 mg/0.5 mL pen injector Inject 5 mg under the skin 1 (one) time per week. 2 mL 1     No current facility-administered medications on file prior to visit.      Allergies   Allergen Reactions    Penicillins Unknown     Almost 50 years ago       [facility] - [place], [place] - [address]  [address] [facility] [address]  Phone: [phone] Fax: [phone]    [facility] - [place], [place] - [address]  [address] [facility] [address]  Phone: [phone] Fax: [phone]    [facility]  [address], [address]  [facility] [address]  Phone: [phone] Fax: [phone]       No issues reported in regards to adverse effects organization.    Diabetes  He presents for his initial diabetic visit. He has type 2 diabetes mellitus. His disease course has been improving (with compliance to metformin and Mounjaro). His home blood glucose trend is decreasing steadily.     HISTORY OF PRESENT ILLNESS  Approximate Date of Diagnosis: 10 years   Known complications due to diabetes included none    Diabetes    SMBG Measurements  Patient is using: glucometer CaresmsPREPsN   The patient is currently checking the blood glucose 3-4 times per day.  Patient does not report symptoms of hypoglycemia. Patient reports none.  Hypoglycemia frequency: none, had 89 mg/dL in early afternoon but no symptoms  Hypoglycemia awareness: No   Patient does not report symptoms of hyperglycemia. Patient previously reports blurry vision, excessive thirst, fatigue, and polyuria. He has not noticed symptoms with    FBG afternoon evening    18-Apr 138 139 149    19-Apr 131 147 151    20-Apr 135 138 159    21-Apr 136 138 140    22-Apr 108 124 148 245   23-Apr 136        Diet: fast food (apples, vegetables/salads), but patient is working on watching his diet.  Dinner: biggest meal of the day  Snacks: often at night  Fluids: 1 bottle of water/day    Physical Activity   Not much. Has a gym and very nice sight-seeing location at [facility]. Is considering walking every night with wife once weather is better    Pertinent PMH Review:  PMH of Pancreatitis: No  PMH of Retinopathy: No  PMH of Urinary Tract Infections: No  PMH of MTC: No    LAB REVIEW   Lab Results   Component Value Date    CREATININE 0.95 04/14/2023      Lab Results   Component Value Date    HGBA1C 9.8 (A) 03/04/2024     HGBA1C 9.3 (A) 11/02/2023    HGBA1C 8.1 (A) 04/10/2023     Lab Results   Component Value Date    TRIG 208 (H) 04/14/2023    TRIG 384 (H) 01/10/2022    TRIG 216 (H) 09/17/2020       Secondary Prevention  Statin? Yes  ACE-I/ARB? Yes  Aspirin? No  Last eye exam: 2 years  Last diabetic foot exam:     The 10-year ASCVD risk score (Kate CONRAD, et al., 2019) is: 5.3%    Values used to calculate the score:      Age: 48 years      Sex: Male      Is Non- : No      Diabetic: Yes      Tobacco smoker: No      Systolic Blood Pressure: 112 mmHg      Is BP treated: Yes      HDL Cholesterol: 35 mg/dL      Total Cholesterol: 138 mg/dL     DIABETES ASSESSMENT    Current Diabetes Medications:   Metformin ER 500mg - 2 tabs (1000mg) BID with meals (noticed his BG was elevated when he was noncompliant)  Mounjaro 5mg weekly on Mon AM (finished 4th dose)    Historical Diabetes Medications:  Janumet (rashes)   Ozempic (major GI effects but had accidentally started from 0.5mg)    AFFORDABILITY/ADHERENCE - Has high deductible (patient initially reported $7500), so none of his medications are currently covered. He is currently able to use copay cards for his commercial insurance which will help him meet his deductible at [facility].  Vraylar, Mounjaro  Test supplies not covered under insurance and don't go towards deductible. Pays for OTC Caresens test strips 50 ct $12.99/month and other tet supplies $7-10/month. Rx at [facility]:  Accuchek test strips 100ct: $46.35  Lancets 100ct: $6.31  Lancet (accuchek softclix 100ct): $14.42    Assessment/Plan   Problem List Items Addressed This Visit       Type 2 diabetes mellitus (Multi)     Patients diabetes needs improvement with most recent A1c of 9.8%  on 3/4/24 (Goal < 7%). Overall BG is stable with Mounjaro 5mg and Metformin ER 1000mg BID, but FBG is elevated likely due to snacking overnight. His goal is to eventually be off metformin.  Start: Mounjaro 7.5mg weekly  (4/29/24-5/20/24)  Patient is aware of $180 copay as result of e-voucher. Consider using new coupon for future fills  Continue: Metformin ER 500mg - 2 tabs (1000mg) BID with meals   If experiencing hypoglycemia too much, decrease to 1 tab every morning and 2 tabs every evening  Discontinue: Glimepiride 2mg - 1.5 tab QD (will consider stopping upon titration of GLP-1 depending on BG levels)  Compliance at present is estimated to be good. Efforts to improve compliance (if necessary) will be directed at:   dietary modifications: less snacking overnight,   increased exercise: walking more (after dinner)  regular blood sugar monitoring: 3-4 times daily with tracking of FBG and BG before biggest meal and 2 hours after biggest meal  Follow-up: 5/21 @ 9am for BG levels and Mounjaro titration/metformin taper if tolerated well and no back order issues. Patient has my contact [phone] for any concerns         Relevant Medications    tirzepatide (Mounjaro) 7.5 mg/0.5 mL pen injector (Start on 4/28/2024)    Other Relevant Orders    Follow Up In Advanced Primary Care - Pharmacy       Notify your healthcare provider if you have any of the following:  -Diarrhea or vomiting for more than six hours  -Blood sugar >300 mg/dL more than once  -Low blood sugar (<70 mg/dL)  -Questions about your medications    Thank you,  Carrie Hebert, PharmD    Continue all meds under the continuation of care with the referring provider and clinical pharmacy team.  Verbal consent to manage patient's drug therapy was obtained. They were informed they may decline to participate or withdraw from participation in pharmacy services at any time.

## 2024-04-23 NOTE — Clinical Note
Patient's BG is looking fairly stable with one elevated outlier last night. I've increased his Mounjaro to 7.5mg once weekly and continued him on metformin ER 1000mg BID which he may decrease by 500mg if he is experiencing hypoglycemia or he will contact me. I will follow-up with him in 4 weeks. Thanks! Carrie Hebert, PharmD

## 2024-04-29 ENCOUNTER — PHARMACY VISIT (OUTPATIENT)
Dept: PHARMACY | Facility: CLINIC | Age: 49
End: 2024-04-29
Payer: COMMERCIAL

## 2024-05-11 ENCOUNTER — PHARMACY VISIT (OUTPATIENT)
Dept: PHARMACY | Facility: CLINIC | Age: 49
End: 2024-05-11
Payer: COMMERCIAL

## 2024-05-11 PROCEDURE — RXMED WILLOW AMBULATORY MEDICATION CHARGE

## 2024-05-21 ENCOUNTER — TELEMEDICINE (OUTPATIENT)
Dept: PHARMACY | Facility: HOSPITAL | Age: 49
End: 2024-05-21
Payer: COMMERCIAL

## 2024-05-21 DIAGNOSIS — E11.9 TYPE 2 DIABETES MELLITUS WITHOUT COMPLICATION, WITHOUT LONG-TERM CURRENT USE OF INSULIN (MULTI): ICD-10-CM

## 2024-05-21 RX ORDER — TIRZEPATIDE 10 MG/.5ML
10 INJECTION, SOLUTION SUBCUTANEOUS
Qty: 2 ML | Refills: 1 | Status: SHIPPED | OUTPATIENT
Start: 2024-05-26

## 2024-05-21 RX ORDER — METFORMIN HYDROCHLORIDE 500 MG/1
1000 TABLET, EXTENDED RELEASE ORAL
Qty: 360 TABLET | Refills: 3 | Status: SHIPPED | OUTPATIENT
Start: 2024-05-21 | End: 2025-05-21

## 2024-05-21 NOTE — PROGRESS NOTES
Pharmacist Clinic: Diabetes Management  Danial Navarro Jr. is a 48 y.o. male was referred to Clinical Pharmacy Team for diabetes management.   Referring Provider: Akbar Youssef DO     Patient is seeing psych who is now managing his vilazodone 20mg and Vraylar 1.5mg.    Current Outpatient Medications on File Prior to Visit   Medication Sig Dispense Refill    atorvastatin (Lipitor) 40 mg tablet Take 1 tablet (40 mg) by mouth once daily. 90 tablet 3    cariprazine (Vraylar) 1.5 mg capsule Take 1 capsule (1.5 mg) by mouth once daily. 90 capsule 0    citalopram (CeleXA) 40 mg tablet Take 1 tablet (40 mg) by mouth once daily. (Patient not taking: Reported on 4/23/2024) 90 tablet 3    lisinopril 10 mg tablet Take 1 tablet (10 mg) by mouth once daily. 90 tablet 3    meloxicam (Mobic) 7.5 mg tablet Take 1 tablet (7.5 mg) by mouth once daily. 90 tablet 3    metFORMIN  mg 24 hr tablet Take 2 tablets (1,000 mg) by mouth 2 times a day with meals. 2 TABLETS TWICE DIALY 360 tablet 3    vilazodone (Viibryd) 20 mg tablet Take 1 tablet (20 mg) by mouth once daily.      [DISCONTINUED] tirzepatide (Mounjaro) 7.5 mg/0.5 mL pen injector Inject 7.5 mg under the skin 1 (one) time per week. 2 mL 1     No current facility-administered medications on file prior to visit.      Allergies   Allergen Reactions    Penicillins Unknown     Almost 50 years ago       [facility] - [place], [place] - [address]  [address]  [facility] [address]  Phone: [phone] Fax: [phone]    [facility] - [place], [place] - [address]  [address] [address]  Phone: [phone] Fax: [phone]    [facility]  [address] , [address]  [facility] [address]  Phone: [phone] Fax: [phone]       No issues reported in regards to adverse effects organization.    Diabetes  He presents for his initial diabetic visit. He has type 2 diabetes mellitus. His disease course has  been improving (with compliance to metformin and Mounjaro). His home blood glucose trend is decreasing steadily.     HISTORY OF PRESENT ILLNESS  Approximate Date of Diagnosis: 10 years   Known complications due to diabetes included none    Diabetes    SMBG Measurements  Patient is using: glucometer CaresensN   The patient is currently checking the blood glucose 3-4 times per day.  Patient does not report symptoms of hypoglycemia. Patient reports none.  Hypoglycemia frequency: none  Hypoglycemia awareness: No   Patient does not report symptoms of hyperglycemia. Patient previously reports blurry vision, excessive thirst, fatigue, and polyuria. He has not noticed symptoms with current regimen  SMBG is usually lower than FBG   FBG   5/10 130   5/11 123   5/12 126   5/13 140   5/14 138   5/15 139   5/16 130   5/17 142   5/18 112   5/19 131   5/20 113   5/21 132     Diet: fast food (apples, vegetables/salads), but patient is working on watching his diet. Appetite has decreased, so lower portions. Not snacking as much at night  Dinner: biggest meal of the day  Snacks: often at night  Fluids: 1 bottle of water/day    Weight: 200 lbs (3/4/24) --> 182 lbs (5/21/24)    Physical Activity   Not much. Has a gym and very nice sight-seeing location at [facility]. Is considering walking every night with wife once weather is better    Pertinent PMH Review:  PMH of Pancreatitis: No  PMH of Retinopathy: No  PMH of Urinary Tract Infections: No  PMH of MTC: No    LAB REVIEW   Lab Results   Component Value Date    CREATININE 0.95 04/14/2023      Lab Results   Component Value Date    HGBA1C 9.8 (A) 03/04/2024    HGBA1C 9.3 (A) 11/02/2023    HGBA1C 8.1 (A) 04/10/2023     Lab Results   Component Value Date    TRIG 208 (H) 04/14/2023    TRIG 384 (H) 01/10/2022    TRIG 216 (H) 09/17/2020       Secondary Prevention  Statin? Yes  ACE-I/ARB? Yes  Aspirin? No  Last eye exam: 2 years  Last diabetic foot exam:     The 10-year ASCVD risk score (Kate CONRAD,  et al., 2019) is: 5.3%    Values used to calculate the score:      Age: 48 years      Sex: Male      Is Non- : No      Diabetic: Yes      Tobacco smoker: No      Systolic Blood Pressure: 112 mmHg      Is BP treated: Yes      HDL Cholesterol: 35 mg/dL      Total Cholesterol: 138 mg/dL     DIABETES ASSESSMENT    Current Diabetes Medications:   Metformin ER 500mg - 2 tabs (1000mg) BID with meals  Mounjaro 7.5mg weekly on Mon AM (last dose today)    Historical Diabetes Medications:  Janumet (rashes)   Ozempic (major GI effects but had accidentally started from 0.5mg)    AFFORDABILITY/ADHERENCE - Has high deductible (patient initially reported $7500), so none of his medications are currently covered. He is currently able to use copay cards for his commercial insurance which will help him meet his deductible at [facility].  Vraylar, Mounjaro  Test supplies not covered under insurance and don't go towards deductible. Pays for OTC Caresens test strips 50 ct $12.99/month and other tet supplies $7-10/month. Rx at [facility]:  Accuchek test strips 100ct: $46.35  Lancets 100ct: $6.31  Lancet (accuchek softclix 100ct): $14.42    Assessment/Plan   Problem List Items Addressed This Visit       Type 2 diabetes mellitus (Multi)     Patient identified self-management goal:  BG control, weight loss, get off metformin  Patients diabetes is improved   Patient's goal A1c is < 7%.  Is pt at goal? No, 9.8% on 3/4/24    Patient's SMBGs are stable but FBG ranges 110s-140s mg/dL     Complications: patient is having constipation  Rationale for plan: Patient's BG is within normal range with some elevation in FBG. He is tolerating Mounjaro and metformin ER well and notes no GI issues with either, except possibly constipation. Weight loss is steady, but may fluctuate with constipation. He reports trying to miss a couple doses of metformin in the AM and noticed drastic change in SMBG those days. Will increase  Mounjaro and have pt attempt metformin all taken in PM to see effect on FBG and reduce pill burden in AM.  Medication Changes:  START  Mounjaro 10 mg once weekly  Metformin 500 mg - 4 tabs (1000 mg) once daily with dinner  Can start with 1 tab QAM and then 3 tabs QPM for a few days to see tolerance  Compliance at present is estimated to be good. Efforts to improve compliance (if necessary) will be directed at   dietary modifications: try to still eat throughout the day to avoid snacking overnight, increase fiber  increased exercise: walking more (after dinner)  regular blood sugar monitoring: 3-4 times daily with tracking of FBG and BG before biggest meal and 2 hours after biggest meal.  Education Provided to Patient: Insurance may be delayed in claims. Deductible should have been met by now. Patient will contact insurance prior to filling Mounjaro   Follow-up: 6/4/24 for tolerance, insurance/med cost, BG control  PCP Follow-up: 6/10/24 due for labs (CMP, lipids, A1c)         Relevant Medications    tirzepatide (Mounjaro) 10 mg/0.5 mL pen injector (Start on 5/26/2024)    Other Relevant Orders    Follow Up In Advanced Primary Care - Pharmacy       Notify your healthcare provider if you have any of the following:  -Diarrhea or vomiting for more than six hours  -Blood sugar >300 mg/dL more than once  -Low blood sugar (<70 mg/dL)  -Questions about your medications    Thank you,  Carrie Hebert, PharmD    Continue all meds under the continuation of care with the referring provider and clinical pharmacy team.  Verbal consent to manage patient's drug therapy was obtained. They were informed they may decline to participate or withdraw from participation in pharmacy services at any time.

## 2024-05-21 NOTE — ASSESSMENT & PLAN NOTE
Patient identified self-management goal:  BG control, weight loss, get off metformin  Patients diabetes is improved   Patient's goal A1c is < 7%.  Is pt at goal? No, 9.8% on 3/4/24    Patient's SMBGs are stable but FBG ranges 110s-140s mg/dL     Complications: patient is having constipation  Rationale for plan: Patient's BG is within normal range with some elevation in FBG. He is tolerating Mounjaro and metformin ER well and notes no GI issues with either, except possibly constipation. Weight loss is steady, but may fluctuate with constipation. He reports trying to miss a couple doses of metformin in the AM and noticed drastic change in SMBG those days. Will increase Mounjaro and have pt attempt metformin all taken in PM to see effect on FBG and reduce pill burden in AM.  Medication Changes:  START  Mounjaro 10 mg once weekly  Metformin 500 mg - 4 tabs (1000 mg) once daily with dinner  Can start with 1 tab QAM and then 3 tabs QPM for a few days to see tolerance  Compliance at present is estimated to be good. Efforts to improve compliance (if necessary) will be directed at   dietary modifications: try to still eat throughout the day to avoid snacking overnight, increase fiber  increased exercise: walking more (after dinner)  regular blood sugar monitoring: 3-4 times daily with tracking of FBG and BG before biggest meal and 2 hours after biggest meal.  Education Provided to Patient: Insurance may be delayed in claims. Deductible should have been met by now. Patient will contact insurance prior to filling Mounjaro   Follow-up: 6/4/24 for tolerance, insurance/med cost, BG control  PCP Follow-up: 6/10/24 due for labs (CMP, lipids, A1c)

## 2024-05-23 ENCOUNTER — TELEPHONE (OUTPATIENT)
Dept: PHARMACY | Facility: HOSPITAL | Age: 49
End: 2024-05-23
Payer: COMMERCIAL

## 2024-05-23 NOTE — TELEPHONE ENCOUNTER
Medication Question: Mounjaro    Patient called about a reaction he may be experiencing with his medication(s). He reports having multiple blistering skin reactions that look like mosquito bites that have been scratched. Initially, he thought it was due to cutting his son's grass, but new blisters have been occurring. He thinks it may have to do with the increased dose of Mounjaro at 7.5mg, which was started on 4/30/24.   Previously, he reports tolerance and effectiveness of Mounjaro 0.5mg on his BG control. Initially the goal was to titrate Mounjaro to max tolerated target dose to get him off of metformin. He has not picked up Mounjaro 10mg yet. He wishes to try to stay on Mounjaro due to its effectiveness, so we discussed options:  A. Continue Mounjaro 5mg (if no reactions) + metformin 1000mg BID with meals  B. Retry Ozempic and titrating from the lowest dose 0.25mg for tolerability. (He did not reached target dose to see its effectiveness prior to stopping it)    Patient is also to check with insurance if he has met his deductible and check the copay of his meds at a local pharmacy and with [facility] if it has been met.    Analyzed the patient's current medications for interactions. No significant interactions can be seen to cause skin reaction. Only other possibilities can be enhanced adverse/toxic effects between:  Cariprazine (Antipsychotic Agents)  -  Vilazodone (Serotonergic Agents)  Lisinopril (Angiotensin-Converting Enzyme Inhibitors)  -  Meloxicam (Nonsteroidal Anti-Inflammatory Agents) - Metformin    Encouraged him to schedule an appointment with PCP or go to urgent care for further assessment/treatment. Rescheduled our follow-up to next week for diabetes management.    Current Outpatient Medications   Medication Instructions    atorvastatin (LIPITOR) 40 mg, oral, Daily    lisinopril 10 mg, oral, Daily    meloxicam (MOBIC) 7.5 mg, oral, Daily    metFORMIN XR (GLUCOPHAGE-XR) 1,000 mg, oral, 2 times  daily (morning and late afternoon), 2 TABLETS TWICE DIALY    [START ON 5/26/2024] Mounjaro 10 mg, subcutaneous, Once Weekly    vilazodone (VIIBRYD) 20 mg, oral, Daily    Vraylar 1.5 mg, oral, Daily      Thank you,  Carrie Hebert, PharmD

## 2024-05-28 ENCOUNTER — APPOINTMENT (OUTPATIENT)
Dept: PHARMACY | Facility: HOSPITAL | Age: 49
End: 2024-05-28
Payer: COMMERCIAL

## 2024-05-30 ENCOUNTER — TELEMEDICINE (OUTPATIENT)
Dept: PHARMACY | Facility: HOSPITAL | Age: 49
End: 2024-05-30
Payer: COMMERCIAL

## 2024-05-30 DIAGNOSIS — E11.9 TYPE 2 DIABETES MELLITUS WITHOUT COMPLICATION, WITHOUT LONG-TERM CURRENT USE OF INSULIN (MULTI): ICD-10-CM

## 2024-05-30 RX ORDER — PREDNISONE 20 MG/1
20 TABLET ORAL 2 TIMES DAILY
COMMUNITY
Start: 2024-05-28 | End: 2024-06-03

## 2024-05-30 RX ORDER — GLIMEPIRIDE 2 MG/1
1 TABLET ORAL
COMMUNITY

## 2024-05-30 RX ORDER — FAMOTIDINE 20 MG/1
20 TABLET, FILM COATED ORAL DAILY
COMMUNITY

## 2024-05-30 NOTE — Clinical Note
Hi Dr. Youssef, I just spoke with the patient today about his reaction last week. He reports that urgent care diagnosed him with something more similar to poison ivy rather than an allergic reaction to Mounjaro, so he was treated with a 7 day course of prednisone and famotidine. We talked about famotidine's possible off-label use if an antihistamine was not sufficient enough, but I just recommended that he discuss further with you tomorrow at his appointment. I restarted him on glimepiride since he was having elevated BG on the steroids and may not be able to fill Mounjaro until he can get his insurance sorted out. Let me know if you have any questions. Thanks! Carrie Hebert, PharmD

## 2024-05-30 NOTE — PROGRESS NOTES
Pharmacist Clinic: Diabetes Management  Danial Navarro Jr. is a 48 y.o. male was referred to Clinical Pharmacy Team for diabetes management.   Referring Provider: Akbar Youssef DO     Patient is seeing psych who is now managing his vilazodone 20mg and Vraylar 1.5mg. Recently went to urgent care for multiple blistering skin reactions that look like mosquito bites that have been scratched. Initially was concerned that it was due to Mounjaro. Was informed at urgent care that it was more likely due to stepping in something similar to poison ivy rather than Mounjaro due to the localized reaction. Was prescribed prednisone 20mg BID and famotidine 20mg daily. Patient denies having any heart burn or acid reflux.  Discussed with patient that famotidine can be used off-label to treat chronic spontaneous urticaria as additional therapy if he has insufficient response to full dose H1 antihistamine for 2- 4 weeks. He reports that he was not currently taking any antihistamine/allergy medication.    Current Outpatient Medications on File Prior to Visit   Medication Sig Dispense Refill    atorvastatin (Lipitor) 40 mg tablet Take 1 tablet (40 mg) by mouth once daily. 90 tablet 3    cariprazine (Vraylar) 1.5 mg capsule Take 1 capsule (1.5 mg) by mouth once daily. 90 capsule 0    famotidine (Pepcid) 20 mg tablet Take 1 tablet (20 mg) by mouth once daily.      glimepiride (Amaryl) 2 mg tablet Take 0.5 tablets (1 mg) by mouth once daily in the evening.  Take before meals.      lisinopril 10 mg tablet Take 1 tablet (10 mg) by mouth once daily. 90 tablet 3    meloxicam (Mobic) 7.5 mg tablet Take 1 tablet (7.5 mg) by mouth once daily. 90 tablet 3    metFORMIN  mg 24 hr tablet Take 2 tablets (1,000 mg) by mouth 2 times daily (morning and late afternoon). 2 TABLETS TWICE DIALY 360 tablet 3    predniSONE (Deltasone) 20 mg tablet Take 1 tablet (20 mg) by mouth 2 times a day.      tirzepatide (Mounjaro) 10 mg/0.5 mL pen injector Inject 10  mg under the skin 1 (one) time per week. (Patient not taking: Reported on 2024) 2 mL 1    vilazodone (Viibryd) 20 mg tablet Take 1 tablet (20 mg) by mouth once daily.       No current facility-administered medications on file prior to visit.      Allergies   Allergen Reactions    Penicillins Unknown     Almost 50 years ago       [facility] - [place], [place] - [address]  [address] [address]  Phone: [phone] Fax: [phone]    [facility] - [place], [place] - [address]  [address] [address]  Phone: [phone] Fax: [phone]    [facility]  [address] , [address]  [facility] [address]  Phone: [phone] Fax: [phone]       No issues reported in regards to adverse effects organization.    Diabetes  He presents for his initial diabetic visit. He has type 2 diabetes mellitus. His disease course has been improving (with compliance to metformin and Mounjaro). His home blood glucose trend is decreasing steadily.     HISTORY OF PRESENT ILLNESS  Approximate Date of Diagnosis: 10 years   Known complications due to diabetes included none    Diabetes    Current Diabetes Medications:   Metformin ER 500mg - 2 tabs (1000mg) BID with meals  Mounjaro 7.5mg weekly on Mon AM (last dose on )    Historical Diabetes Medications:  Janumet (rashes)   Ozempic (major GI effects but had accidentally started from 0.5mg)    SMBG Measurements  Patient is using: glucometer Care[employer] BookssN   The patient is currently checking the blood glucose 3-4 times per day.  Patient does not report symptoms of hypoglycemia. Patient reports none.  Hypoglycemia frequency: none  Hypoglycemia awareness: No   Patient does not report symptoms of hyperglycemia. Patient previously reports blurry vision, excessive thirst, fatigue, and polyuria. He has not noticed symptoms with current regimen  SMBG has been elevated lately. FB-150s. BG may  occasionally go up to 200s    Diet: fast food (apples, vegetables/salads), but patient is working on watching his diet. Appetite has decreased, so lower portions. Not snacking as much at night  Dinner: biggest meal of the day  Snacks: often at night  Fluids: 1 bottle of water/day    Weight: 200 lbs (3/4/24) --> 182 lbs (5/21/24)    Physical Activity   Not much. Has a gym and very nice sight-seeing location at [facility]. Is considering walking every night with wife once weather is better    Pertinent PMH Review:  PMH of Pancreatitis: No  PMH of Retinopathy: No  PMH of Urinary Tract Infections: No  PMH of MTC: No    AFFORDABILITY/ADHERENCE - Has high deductible ($7500), so none of his medications are covered until it is met. Currently, having insurance issues likely from their end. Has already met the deductible with prescription costs of $8373.69 using copay cards, but insurance claims he has medical claims he has not paid for ($1876.40) so remaining deductible is supposedly $6497.29.   Andre Bright (unable to afford at the moment while insurance still is claiming he owes for medical claim which he denies any known balance)  Test supplies not covered under insurance and don't go towards deductible. Pays for OTC Caresens test strips 50 ct $12.99/month and other tet supplies $7-10/month. Rx at [facility]:  Accuchek test strips 100ct: $46.35  Lancets 100ct: $6.31  Lancet (accuchek softclix 100ct): $14.42    LAB REVIEW   Lab Results   Component Value Date    CREATININE 0.95 04/14/2023      Lab Results   Component Value Date    HGBA1C 9.8 (A) 03/04/2024    HGBA1C 9.3 (A) 11/02/2023    HGBA1C 8.1 (A) 04/10/2023     Lab Results   Component Value Date    TRIG 208 (H) 04/14/2023    TRIG 384 (H) 01/10/2022    TRIG 216 (H) 09/17/2020       Secondary Prevention  Statin? Yes  ACE-I/ARB? Yes  Aspirin? No  Last eye exam: 2 years  Last diabetic foot exam:     The 10-year ASCVD risk score (Kate CONRAD, et al., 2019) is: 5.3%     Values used to calculate the score:      Age: 48 years      Sex: Male      Is Non- : No      Diabetic: Yes      Tobacco smoker: No      Systolic Blood Pressure: 112 mmHg      Is BP treated: Yes      HDL Cholesterol: 35 mg/dL      Total Cholesterol: 138 mg/dL       Assessment/Plan   Problem List Items Addressed This Visit       Type 2 diabetes mellitus (Multi)     Patient identified self-management goal:  BG control, weight loss, get off metformin  Patients diabetes was improved but has been uncontrolled lately after missing a dose of Mounjaro  Patient's goal A1c is < 7%.  Is pt at goal? No, 9.8% on 3/4/24    Patient's SMBGs are reportedly elevated but this may be due to steroids he is currently taking  Complications: patient is having constipation  Rationale for plan: Patient's BG was within normal range with some elevation in FBG while  on Mounjaro 7.5mg and metformin ER 1000mg BID. Tolerating it well denies any no GI issues with either, except possibly constipation. Weight loss is steady, but may fluctuate with constipation. If he misses metformin in the AM, noticed drastic change in SMBG those days. Missing a dose of Mounjaro due to insurance not covering it right now. Paln was to increase Mounjaro to 10mg. Will have patient retake glimepiride before dinner in interim until insurance issues are cleared up.  Medication Changes:  START  Glimepiride 2mg - 0.5 tab once daily before dinner  May increase to 1 tab once daily before dinner if BG is still elevated  Metformin 500 mg - 4 tabs (1000 mg) once daily with dinner  Can start with 1 tab QAM and then 3 tabs QPM for a few days to see tolerance  HOLD: Mounjaro 10 mg once weekly (upon coverage approval until follow-up)  Compliance at present is estimated to be good. Efforts to improve compliance (if necessary) will be directed at   dietary modifications: try to still eat throughout the day to avoid snacking overnight, increase fiber  increased  exercise: walking more (after dinner)  regular blood sugar monitoring: 3-4 times daily with tracking of FBG and BG before biggest meal and 2 hours after biggest meal.  Education Provided to Patient: What insurance informed me about his deductible, which should have been met by now. Patient will contact insurance. Glimepiride MOA/side effects/dosing, BG monitoring, use of famotidine and prednisone. May miss a dose of Mounjaro until follow-up  Follow-up: 6/4/24 for insurance/med cost update, BG control and med adjustment as needed  PCP Follow-up: 5/31/24 for evaluation of allergic reaction and treatment. Due for labs (CMP, lipids. May consider A1c)         Relevant Orders    Lipid panel    Comprehensive metabolic panel    Follow Up In Advanced Primary Care - Pharmacy         Notify your healthcare provider if you have any of the following:  -Diarrhea or vomiting for more than six hours  -Blood sugar >300 mg/dL more than once  -Low blood sugar (<70 mg/dL)  -Questions about your medications    Thank you,  Carrie Hebert, PharmD    Continue all meds under the continuation of care with the referring provider and clinical pharmacy team.  Verbal consent to manage patient's drug therapy was obtained. They were informed they may decline to participate or withdraw from participation in pharmacy services at any time.

## 2024-05-30 NOTE — PATIENT INSTRUCTIONS
Patient Blood Sugar Goals  - Fasting blood sugar (before eating, at least 8 hours fasting): 70 - 130 mg/dL  - Postprandial blood sugar (throughout the day after eating): less than 180 mg/dL  - A1c: less than 7%    Watch for signs and symptoms of low /high blood sugar:   Low BG (below goal) High BG (above goal)   Early symptoms Looking pale (pallor)  Shakiness  Dizziness or lightheadedness  Sweating  Hunger or nausea  An irregular or fast heartbeat  Difficulty concentrating  Fatigue: feeling weak and having no energy  Irritability or anxiety  Headache  Tingling or numbness of the lips, tongue or cheek Greatly increased urination  Thirst  Extreme hunger  Extreme fatigue  Confusion  Blurred vision/visual changes  Infections (urinary/skin)   Severe Confusion, unusual behavior or both, such as the inability to complete routine tasks  Loss of coordination  Difficulty speaking or slurred speech  Blurry or tunnel vision  Inability to eat or drink  Muscle weakness  Drowsiness  Seizures  Coma Shortness of breath  Fruity-smelling breath  Nausea and vomiting  Abdominal pain  Very dry mouth  Seizures  Loss of consciousness/Coma

## 2024-05-30 NOTE — ASSESSMENT & PLAN NOTE
Patient identified self-management goal:  BG control, weight loss, get off metformin  Patients diabetes was improved but has been uncontrolled lately after missing a dose of Mounjaro  Patient's goal A1c is < 7%.  Is pt at goal? No, 9.8% on 3/4/24    Patient's SMBGs are reportedly elevated but this may be due to steroids he is currently taking  Complications: patient is having constipation  Rationale for plan: Patient's BG was within normal range with some elevation in FBG while  on Mounjaro 7.5mg and metformin ER 1000mg BID. Tolerating it well denies any no GI issues with either, except possibly constipation. Weight loss is steady, but may fluctuate with constipation. If he misses metformin in the AM, noticed drastic change in SMBG those days. Missing a dose of Mounjaro due to insurance not covering it right now. Paln was to increase Mounjaro to 10mg. Will have patient retake glimepiride before dinner in interim until insurance issues are cleared up.  Medication Changes:  START  Glimepiride 2mg - 0.5 tab once daily before dinner  May increase to 1 tab once daily before dinner if BG is still elevated  Metformin 500 mg - 4 tabs (1000 mg) once daily with dinner  Can start with 1 tab QAM and then 3 tabs QPM for a few days to see tolerance  HOLD: Mounjaro 10 mg once weekly (upon coverage approval until follow-up)  Compliance at present is estimated to be good. Efforts to improve compliance (if necessary) will be directed at   dietary modifications: try to still eat throughout the day to avoid snacking overnight, increase fiber  increased exercise: walking more (after dinner)  regular blood sugar monitoring: 3-4 times daily with tracking of FBG and BG before biggest meal and 2 hours after biggest meal.  Education Provided to Patient: What insurance informed me about his deductible, which should have been met by now. Patient will contact insurance. Glimepiride MOA/side effects/dosing, BG monitoring, use of famotidine and  prednisone. May miss a dose of Mounjaro until follow-up  Follow-up: 6/4/24 for insurance/med cost update, BG control and med adjustment as needed  PCP Follow-up: 5/31/24 for evaluation of allergic reaction and treatment. Due for labs (CMP, lipids. May consider A1c)

## 2024-05-31 ENCOUNTER — OFFICE VISIT (OUTPATIENT)
Dept: PRIMARY CARE | Facility: CLINIC | Age: 49
End: 2024-05-31
Payer: COMMERCIAL

## 2024-05-31 VITALS
BODY MASS INDEX: 25.59 KG/M2 | WEIGHT: 183.5 LBS | HEART RATE: 93 BPM | RESPIRATION RATE: 16 BRPM | DIASTOLIC BLOOD PRESSURE: 77 MMHG | SYSTOLIC BLOOD PRESSURE: 118 MMHG | OXYGEN SATURATION: 96 % | TEMPERATURE: 97.8 F

## 2024-05-31 DIAGNOSIS — E11.9 TYPE 2 DIABETES MELLITUS WITHOUT COMPLICATION, UNSPECIFIED WHETHER LONG TERM INSULIN USE (MULTI): Primary | ICD-10-CM

## 2024-05-31 LAB — POC HEMOGLOBIN A1C: 6.9 % (ref 4.2–6.5)

## 2024-05-31 PROCEDURE — 3074F SYST BP LT 130 MM HG: CPT | Performed by: STUDENT IN AN ORGANIZED HEALTH CARE EDUCATION/TRAINING PROGRAM

## 2024-05-31 PROCEDURE — 83036 HEMOGLOBIN GLYCOSYLATED A1C: CPT | Performed by: STUDENT IN AN ORGANIZED HEALTH CARE EDUCATION/TRAINING PROGRAM

## 2024-05-31 PROCEDURE — 3078F DIAST BP <80 MM HG: CPT | Performed by: STUDENT IN AN ORGANIZED HEALTH CARE EDUCATION/TRAINING PROGRAM

## 2024-05-31 PROCEDURE — 99214 OFFICE O/P EST MOD 30 MIN: CPT | Performed by: STUDENT IN AN ORGANIZED HEALTH CARE EDUCATION/TRAINING PROGRAM

## 2024-05-31 PROCEDURE — 4010F ACE/ARB THERAPY RXD/TAKEN: CPT | Performed by: STUDENT IN AN ORGANIZED HEALTH CARE EDUCATION/TRAINING PROGRAM

## 2024-05-31 ASSESSMENT — ENCOUNTER SYMPTOMS
FEVER: 0
DIZZINESS: 0
POLYDIPSIA: 0
BLURRED VISION: 0
FATIGUE: 0
NAUSEA: 0
POLYPHAGIA: 0
LIGHT-HEADEDNESS: 0
VOMITING: 0
WEIGHT LOSS: 0
SHORTNESS OF BREATH: 0

## 2024-05-31 NOTE — PROGRESS NOTES
Subjective   Danial Navarro Jr. is a 48 y.o. male who presents for Diabetes (Pt here today for recheck of A1C and sore to left leg).  Diabetes  He presents for his follow-up diabetic visit. He has type 2 diabetes mellitus. His disease course has been worsening. There are no hypoglycemic associated symptoms. Pertinent negatives for hypoglycemia include no dizziness. Pertinent negatives for diabetes include no blurred vision, no chest pain, no fatigue, no foot paresthesias, no polydipsia, no polyphagia and no weight loss. There are no hypoglycemic complications. There are no diabetic complications. Risk factors for coronary artery disease include diabetes mellitus and male sex. Current diabetic treatment includes diet and oral agent (dual therapy). He is compliant with treatment all of the time. His weight is stable. He is following a diabetic and generally healthy diet. When asked about meal planning, he reported none. He participates in exercise daily. An ACE inhibitor/angiotensin II receptor blocker is being taken. He does not see a podiatrist.Eye exam is current.   Med Refill  Pertinent negatives include no chest pain, fatigue, fever, nausea or vomiting.     Patient is following with psychiatry, switch to Viibryd from Celexa is continued on Vraylar and doing very well.    Review of Systems   Constitutional:  Negative for fatigue, fever and weight loss.   Eyes:  Negative for blurred vision.   Respiratory:  Negative for shortness of breath.    Cardiovascular:  Negative for chest pain.   Gastrointestinal:  Negative for nausea and vomiting.   Endocrine: Negative for polydipsia and polyphagia.   Neurological:  Negative for dizziness and light-headedness.   All other systems reviewed and are negative.      Objective   Physical Exam  Vitals reviewed.   Constitutional:       General: He is not in acute distress.     Appearance: Normal appearance. He is not toxic-appearing.   HENT:      Head: Normocephalic and atraumatic.       Nose: Nose normal.   Eyes:      Extraocular Movements: Extraocular movements intact.   Cardiovascular:      Rate and Rhythm: Normal rate and regular rhythm.      Heart sounds: No murmur heard.     No friction rub. No gallop.   Pulmonary:      Effort: Pulmonary effort is normal. No respiratory distress.      Breath sounds: Normal breath sounds. No wheezing, rhonchi or rales.   Skin:     General: Skin is warm and dry.   Neurological:      General: No focal deficit present.      Mental Status: He is alert.   Psychiatric:         Mood and Affect: Mood normal.         Behavior: Behavior normal.         Assessment/Plan   Problem List Items Addressed This Visit       Type 2 diabetes mellitus (Multi) - Primary    Relevant Orders    POCT glycosylated hemoglobin (Hb A1C) manually resulted (Completed)     Lab Results   Component Value Date    HGBA1C 6.9 (A) 05/31/2024     Continue treatment including: Metformin, Sulfonylurea, and GLP-1/terzepatide  Complications include: none  Continue to monitor blood sugars  Continue with follow ups including:   Ophthalmology: Up-to-date  Podiatry: Not Indicated  Cardiology: Not Indicated  Discussed healthy, low carb diet and trying to get 150 minutes of physical activity per week.  Follow up in 3 months or sooner if new concerns arise     Congratulated patient on improvement.  We will discontinue his sulfonylurea and metformin continue on GLP-1 only continue with pharmacy for coverage.    Continue follow-up with psychiatry continue on current psychiatric regimen.    Follow-up as new concerns arise

## 2024-06-04 ENCOUNTER — APPOINTMENT (OUTPATIENT)
Dept: PHARMACY | Facility: HOSPITAL | Age: 49
End: 2024-06-04
Payer: COMMERCIAL

## 2024-06-04 ENCOUNTER — TELEMEDICINE (OUTPATIENT)
Dept: PHARMACY | Facility: HOSPITAL | Age: 49
End: 2024-06-04
Payer: COMMERCIAL

## 2024-06-04 DIAGNOSIS — E11.9 TYPE 2 DIABETES MELLITUS WITHOUT COMPLICATION, WITHOUT LONG-TERM CURRENT USE OF INSULIN (MULTI): ICD-10-CM

## 2024-06-04 NOTE — PROGRESS NOTES
Pharmacist Clinic: Diabetes Management  Danial Navarro Jr. is a 48 y.o. male was referred to Clinical Pharmacy Team for diabetes management.   Referring Provider: Akbar Youssef DO     Patient is seeing psych who is now managing his vilazodone 20mg and Vraylar 1.5mg.     Current Outpatient Medications   Medication Instructions    atorvastatin (LIPITOR) 40 mg, oral, Daily    cariprazine (Vraylar) 1.5 mg capsule TAKE 1 CAPSULE BY MOUTH ONCE DAILY    famotidine (PEPCID) 20 mg, oral, Daily    glimepiride (AMARYL) 1 mg, oral, Daily before evening meal    lisinopril 10 mg, oral, Daily    meloxicam (MOBIC) 7.5 mg, oral, Daily    metFORMIN XR (GLUCOPHAGE-XR) 1,000 mg, oral, 2 times daily (morning and late afternoon), 2 TABLETS TWICE DIALY    Mounjaro 10 mg, subcutaneous, Once Weekly    vilazodone (VIIBRYD) 20 mg, oral, Daily       Allergies   Allergen Reactions    Penicillins Unknown     Almost 50 years ago       [id number] - [place], [place] - [address] [address]  [address] [facility] [address]  Phone: [phone] Fax: [phone]    [facility] - [address] [address]  Phone: [phone] Fax: [phone]    [facility]  [address], [address]  [facility] [place] [address]  Phone: [phone] Fax: [phone]       No issues reported in regards to adverse effects organization.    Diabetes  He presents for his initial diabetic visit. He has type 2 diabetes mellitus. His disease course has been improving (with compliance to metformin and Mounjaro). His home blood glucose trend is decreasing steadily.     HISTORY OF PRESENT ILLNESS  Approximate Date of Diagnosis: 10 years   Known complications due to diabetes included none    Diabetes    Current Diabetes Medications:   Metformin ER 500mg - 2 tabs (1000mg) BID with meals  Ozempic 0.5 mg (sample. Injected 2 doses but patient thinks he broke it)    Historical Diabetes  Medications:  Janumet (rashes)   Ozempic (major GI effects but had accidentally started from 0.5mg)  Mounjaro 7.5mg weekly on Mon AM (last dose on )    SMBG Measurements  Patient is using: glucometer CaresensN   The patient is currently checking the blood glucose 3-4 times per day.  Patient does not report symptoms of hypoglycemia. Patient reports none.  Hypoglycemia frequency: none  Hypoglycemia awareness: No   Patient does not report symptoms of hyperglycemia. Patient previously reports blurry vision, excessive thirst, fatigue, and polyuria. He has not noticed symptoms with current regimen  SMBG has been elevated lately. FB-150s. BG may occasionally go up to 200s    Diet: fast food (apples, vegetables/salads), but patient is working on watching his diet. Appetite has decreased, so lower portions. Not snacking as much at night  Breakfast: wheat toast  Lunch: lunch meat sandwich, salad, apple  Dinner: biggest meal of the day  Snacks: often at night  Fluids: 1 bottle of water/day    Weight: 200 lbs (3/4/24) --> 182 lbs (24)    Physical Activity   Not much. Has a gym and very nice sight-seeing location at [facility]. Is considering walking every night with wife once weather is better    Pertinent PMH Review:  PMH of Pancreatitis: No  PMH of Retinopathy: No  PMH of Urinary Tract Infections: No  PMH of MTC: No    AFFORDABILITY/ADHERENCE - Has high deductible ($7500), so none of his medications are covered until it is met. Currently, having insurance issues likely from their end. Has already met the deductible with prescription costs of $8373.69 using copay cards, but insurance claims he has medical claims he has not paid for ($1876.40) so remaining deductible is supposedly $6497.29.   Andre Bright (unable to afford at the moment while insurance still is claiming he owes for medical claim which he denies any known balance)  Test supplies not covered under insurance and don't go towards deductible. Pays for  OTC Caresens test strips 50 ct $12.99/month and other tet supplies $7-10/month. Rx at  [place]:  Accuchek test strips 100ct: $46.35  Lancets 100ct: $6.31  Lancet (accuchek softclix 100ct): $14.42    LAB REVIEW   Lab Results   Component Value Date    CREATININE 0.95 04/14/2023      Lab Results   Component Value Date    HGBA1C 6.9 (A) 05/31/2024    HGBA1C 9.8 (A) 03/04/2024    HGBA1C 9.3 (A) 11/02/2023     Lab Results   Component Value Date    TRIG 208 (H) 04/14/2023    TRIG 384 (H) 01/10/2022    TRIG 216 (H) 09/17/2020       Secondary Prevention  Statin? Yes  ACE-I/ARB? Yes  Aspirin? No  Last eye exam: 2 years  Last diabetic foot exam:     The 10-year ASCVD risk score (Kate CONRAD, et al., 2019) is: 5.8%    Values used to calculate the score:      Age: 48 years      Sex: Male      Is Non- : No      Diabetic: Yes      Tobacco smoker: No      Systolic Blood Pressure: 118 mmHg      Is BP treated: Yes      HDL Cholesterol: 35 mg/dL      Total Cholesterol: 138 mg/dL       Assessment/Plan   Problem List Items Addressed This Visit       Type 2 diabetes mellitus (Multi)     Patient identified self-management goal:  BG control, weight loss, get off metformin  Patients diabetes was improved but has been uncontrolled lately after missing a dose of Mounjaro. Recently got sample of Ozempic and injected 0.5mg on 5/31 and 6/6. No side effects as previously reported.  Patient's goal A1c is < 7%.  Is pt at goal? Yes, 6.9% on 5/31/24    Patient's SMBGs are reportedly elevated since stopping Mounjaro. Reports FBG and PPBG are about the same and ranging 180-210s  Complications: Insurance claims seem to be inaccurate and have not corrected deductible, so copays are still high. May have broken the Ozempic pen - unable to dial it anymore.  Rationale for plan: Patient's BG was within normal range with some elevation in FBG while  on Mounjaro 7.5mg and metformin ER 1000mg BID. Tolerating Ozempic with no GI issues now  but believes he broke the pen and may not be able to take the next dose. Plan was to increase Mounjaro to 10mg. Will have patient retake glimepiride before breakfast and dinner in interim until insurance issues are cleared up. May require Jardiance due to lower copay and it may help with deductible after patient fills Vraylar before follow-up  Medication Changes:  START  Glimepiride 2mg - 0.5 tab once daily before breakfast and dinner  May increase to 1 tab once daily before dinner if BG is still elevated  Metformin 500 mg - 2 tabs (1000 mg) twice daily with dinner  HOLD:   Mounjaro 10 mg once weekly (per pt, Dr. Youssef wants him to stay with Mounjaro 10mg once covered)  Jardiance 10 mg once daily  May start if unable to fill Mounjaro  Compliance at present is estimated to be good. Efforts to improve compliance (if necessary) will be directed at   dietary modifications: try to still eat throughout the day to avoid snacking overnight, increase fiber  increased exercise: walking more (after dinner)  regular blood sugar monitoring: 3-4 times daily with tracking of FBG and BG before biggest meal and 2 hours after biggest meal.  Education Provided to Patient: What insurance informed me about his deductible, which should have been met by now. Patient will contact insurance. Glimepiride MOA/side effects/dosing, BG monitoring,   Follow-up: 6/11/24 @ 4:30 for insurance/med cost update, BG control and med adjustment as needed. May need to start Farxiga if Mounjaro is still not covered and adjust glimepiride  PCP Follow-up: 12/2/24 for evaluation of allergic reaction and treatment. Due for labs (CMP, lipids. May consider A1c)            Notify your healthcare provider if you have any of the following:  -Diarrhea or vomiting for more than six hours  -Blood sugar >300 mg/dL more than once  -Low blood sugar (<70 mg/dL)  -Questions about your medications    Thank you,  Carrie Hebert, PharmD    Continue all meds under the  continuation of care with the referring provider and clinical pharmacy team.  Verbal consent to manage patient's drug therapy was obtained. They were informed they may decline to participate or withdraw from participation in pharmacy services at any time.

## 2024-06-07 RX ORDER — DAPAGLIFLOZIN 5 MG/1
5 TABLET, FILM COATED ORAL DAILY
Qty: 30 TABLET | Refills: 0 | Status: CANCELLED | OUTPATIENT
Start: 2024-06-07

## 2024-06-07 NOTE — ASSESSMENT & PLAN NOTE
Patient identified self-management goal:  BG control, weight loss, get off metformin  Patients diabetes was improved but has been uncontrolled lately after missing a dose of Mounjaro. Recently got sample of Ozempic and injected 0.5mg on 5/31 and 6/6. No side effects as previously reported.  Patient's goal A1c is < 7%.  Is pt at goal? Yes, 6.9% on 5/31/24    Patient's SMBGs are reportedly elevated since stopping Mounjaro. Reports FBG and PPBG are about the same and ranging 180-210s  Complications: Insurance claims seem to be inaccurate and have not corrected deductible, so copays are still high. May have broken the Ozempic pen - unable to dial it anymore.  Rationale for plan: Patient's BG was within normal range with some elevation in FBG while  on Mounjaro 7.5mg and metformin ER 1000mg BID. Tolerating Ozempic with no GI issues now but believes he broke the pen and may not be able to take the next dose. Plan was to increase Mounjaro to 10mg. Will have patient retake glimepiride before breakfast and dinner in interim until insurance issues are cleared up. May require Jardiance due to lower copay and it may help with deductible after patient fills Vraylar before follow-up  Medication Changes:  START  Glimepiride 2mg - 0.5 tab once daily before breakfast and dinner  May increase to 1 tab once daily before dinner if BG is still elevated  Metformin 500 mg - 2 tabs (1000 mg) twice daily with dinner  HOLD:   Mounjaro 10 mg once weekly (per pt, Dr. Youssef wants him to stay with Mounjaro 10mg once covered)  Jardiance 10 mg once daily  May start if unable to fill Mounjaro  Compliance at present is estimated to be good. Efforts to improve compliance (if necessary) will be directed at   dietary modifications: try to still eat throughout the day to avoid snacking overnight, increase fiber  increased exercise: walking more (after dinner)  regular blood sugar monitoring: 3-4 times daily with tracking of FBG and BG before biggest meal  and 2 hours after biggest meal.  Education Provided to Patient: What insurance informed me about his deductible, which should have been met by now. Patient will contact insurance. Glimepiride MOA/side effects/dosing, BG monitoring,   Follow-up: 6/11/24 @ 4:30 for insurance/med cost update, BG control and med adjustment as needed. May need to start Farxiga if Mounjaro is still not covered and adjust glimepiride  PCP Follow-up: 12/2/24 for evaluation of allergic reaction and treatment. Due for labs (CMP, lipids. May consider A1c)

## 2024-06-10 ENCOUNTER — PHARMACY VISIT (OUTPATIENT)
Dept: PHARMACY | Facility: CLINIC | Age: 49
End: 2024-06-10
Payer: COMMERCIAL

## 2024-06-10 ENCOUNTER — APPOINTMENT (OUTPATIENT)
Dept: PRIMARY CARE | Facility: CLINIC | Age: 49
End: 2024-06-10
Payer: COMMERCIAL

## 2024-06-10 PROCEDURE — RXMED WILLOW AMBULATORY MEDICATION CHARGE

## 2024-06-11 ENCOUNTER — TELEMEDICINE (OUTPATIENT)
Dept: PHARMACY | Facility: HOSPITAL | Age: 49
End: 2024-06-11
Payer: COMMERCIAL

## 2024-06-11 DIAGNOSIS — E11.9 TYPE 2 DIABETES MELLITUS WITHOUT COMPLICATION, WITHOUT LONG-TERM CURRENT USE OF INSULIN (MULTI): ICD-10-CM

## 2024-06-11 NOTE — PROGRESS NOTES
Pharmacist Clinic: Diabetes Management  Danial Navarro Jr. is a 48 y.o. male was referred to Clinical Pharmacy Team for diabetes management.   Referring Provider: Akbar Youssef DO     Patient is seeing psych who is now managing his vilazodone 20mg and Vraylar 1.5mg.     Current Outpatient Medications   Medication Instructions    atorvastatin (LIPITOR) 40 mg, oral, Daily    cariprazine (Vraylar) 1.5 mg capsule TAKE 1 CAPSULE BY MOUTH ONCE DAILY    famotidine (PEPCID) 20 mg, oral, Daily    glimepiride (Amaryl) 2 mg tablet Take by mouth 0.5 tab (1mg) once daily before breakfast and and 1 tab (2mg)  before dinner    lisinopril 10 mg, oral, Daily    meloxicam (MOBIC) 7.5 mg, oral, Daily    metFORMIN XR (GLUCOPHAGE-XR) 1,000 mg, oral, 2 times daily (morning and late afternoon), 2 TABLETS TWICE DIALY    Mounjaro 10 mg, subcutaneous, Once Weekly    vilazodone (VIIBRYD) 20 mg, oral, Daily       Allergies   Allergen Reactions    Penicillins Unknown     Almost 50 years ago       [facility] - [place], [place] - [address] [facility]  [address] [facility] [address]  Phone: [phone] Fax: [phone]    [facility] [facility] - [address] [address]  Phone: [phone] Fax: [phone]    [facility]  [address] , [address]  [facility] [address]  Phone: [phone] Fax: [phone]     No issues reported in regards to adverse effects organization.    Diabetes  He presents for his initial diabetic visit. He has type 2 diabetes mellitus. His disease course has been improving (with compliance to metformin and Mounjaro). His home blood glucose trend is decreasing steadily.     HISTORY OF PRESENT ILLNESS  Approximate Date of Diagnosis: 10 years   Known complications due to diabetes included none    Diabetes    Current Diabetes Medications:   Metformin ER 500mg - 2 tabs (1000mg) BID with meals  Ozempic 0.5 mg (sample. Injected 2 doses  but patient thinks he broke it, no side effects but doesn't think it's as effective)  Glimepiride 2mg - 0.5 tab (1mg) once daily before breakfast and dinner    Historical Diabetes Medications:  Janumet (rashes)   Ozempic (major GI effects but had accidentally started from 0.5mg)  Mounjaro 7.5mg weekly on Mon AM (last dose on 5/21, unable to obtain due to ins)    SMBG Measurements  Patient is using: glucometer CaresensN   The patient is currently checking the blood glucose 3-4 times per day.  Patient does not report symptoms of hypoglycemia. Patient reports none.  Hypoglycemia frequency: none  Hypoglycemia awareness: No   Patient does not report symptoms of hyperglycemia. Patient previously reports blurry vision, excessive thirst, fatigue, and polyuria. He has not noticed symptoms with current regimen  Date FBG Lunch Dinner   8-Jun 205 182 175   9-Jun 205 138 160   10-Jun 190 170 155   11-Jun 188 173      Diet: fast food (apples, vegetables/salads), but patient is working on watching his diet. Appetite has decreased, so lower portions. Not snacking as much at night  Breakfast: wheat toast  Lunch: lunch meat sandwich, salad, apple  Dinner: biggest meal of the day  Snacks: often at night  Fluids: 1 bottle of water/day    Weight: 200 lbs (3/4/24) --> 182 lbs (5/21/24)    Physical Activity   Not much. Has a gym and very nice sight-seeing location at [facility]. Is considering walking every night with wife once weather is better    Pertinent PMH Review:  PMH of Pancreatitis: No  PMH of Retinopathy: No  PMH of Urinary Tract Infections: No  PMH of MTC: No    AFFORDABILITY/ADHERENCE - Has high deductible ($7500), so none of his medications are covered until it is met. Currently, having insurance issues likely from their end. Has already met the deductible with prescription costs of $8373.69 using copay cards, but insurance claims he has medical claims he has not paid for ($1876.40) so remaining deductible is supposedly $6497.29.    Vraylar, Mounjaro (unable to afford at the moment while insurance still is claiming he owes for medical claim which he denies any known balance)  Test supplies not covered under insurance and don't go towards deductible. Pays for OTC Caresens test strips 50 ct $12.99/month and other tet supplies $7-10/month. Rx at [facility]:  Accuchek test strips 100ct: $46.35  Lancets 100ct: $6.31  Lancet (accuchek softclix 100ct): $14.42    LAB REVIEW   Lab Results   Component Value Date    CREATININE 0.95 04/14/2023      Lab Results   Component Value Date    HGBA1C 6.9 (A) 05/31/2024    HGBA1C 9.8 (A) 03/04/2024    HGBA1C 9.3 (A) 11/02/2023     Lab Results   Component Value Date    TRIG 208 (H) 04/14/2023    TRIG 384 (H) 01/10/2022    TRIG 216 (H) 09/17/2020       Secondary Prevention  Statin? Yes  ACE-I/ARB? Yes  Aspirin? No  Last eye exam: 2 years  Last diabetic foot exam:     The 10-year ASCVD risk score (Kate CONRAD, et al., 2019) is: 5.8%    Values used to calculate the score:      Age: 48 years      Sex: Male      Is Non- : No      Diabetic: Yes      Tobacco smoker: No      Systolic Blood Pressure: 118 mmHg      Is BP treated: Yes      HDL Cholesterol: 35 mg/dL      Total Cholesterol: 138 mg/dL       Assessment/Plan   Problem List Items Addressed This Visit       Type 2 diabetes mellitus (Multi)     Patient identified self-management goal:  BG control, weight loss, get off metformin  Patients diabetes was improved but has been uncontrolled lately after missing a dose of Mounjaro. Recently got sample of Ozempic and injected 0.5mg on 5/31 and 6/6. No side effects as previously reported.  Patient's goal A1c is < 7%.  Is pt at goal? Yes, 6.9% on 5/31/24    Patient's SMBGs are reportedly elevated since stopping Mounjaro. Reports FBG are elevated but PPBG are generally within range  Complications: Insurance claims seem to be inaccurate and have not corrected deductible, so copays are still high. Patient  reports he broke the Ozempic pen - unable to dial it anymore. May require further education  Rationale for plan: Patient's BG was within normal range with some elevation in FBG while on Mounjaro 7.5mg and metformin ER 1000mg BID. Tolerating Ozempic with no GI issues now but believes he broke the pen and may not be able to take the next dose. Plan was to increase Mounjaro to 10mg. BG seems to have improved with glimepiride but FBG still elevated. Will take glimepiride accordingly before breakfast and dinner in interim until insurance issues are cleared up. May require Farxiga due to lower copay and it may help with deductible after patient fills Vraylar before follow-up  Medication Changes:  START  Glimepiride 2mg - 0.5 tab (1mg) once daily before breakfast and 1 tab (2mg) before dinner  Metformin 500 mg - 2 tabs (1000 mg) twice daily with dinner  HOLD:   Mounjaro 10 mg once weekly (per pt, Dr. Youssef wants him to stay with Mounjaro 10mg once covered) Educated patient that if more doses are skipped, likely will need to restart titration at lower dose until able to afford Mounjaro again  Compliance at present is estimated to be good. Efforts to improve compliance (if necessary) will be directed at   dietary modifications: try to still eat throughout the day to avoid snacking overnight, increase fiber  increased exercise: walking more (after dinner)  regular blood sugar monitoring: 3-4 times daily with tracking of FBG and BG before biggest meal and 2 hours after biggest meal.  Education Provided to Patient: What insurance informed me about his deductible, which should have been met by now. Patient will contact insurance. Glimepiride MOA/side effects/dosing, BG monitoring,   Follow-up: 6/14/24 @ 1pm Ozempic sample and education  PCP Follow-up: 12/2/24 for evaluation of allergic reaction and treatment. Due for labs (CMP, lipids. May consider A1c)              Notify your healthcare provider if you have any of the  following:  -Diarrhea or vomiting for more than six hours  -Blood sugar >300 mg/dL more than once  -Low blood sugar (<70 mg/dL)  -Questions about your medications    Thank you,  Carrie Hebert, PharmD    Continue all meds under the continuation of care with the referring provider and clinical pharmacy team.  Verbal consent to manage patient's drug therapy was obtained. They were informed they may decline to participate or withdraw from participation in pharmacy services at any time.

## 2024-06-12 NOTE — ASSESSMENT & PLAN NOTE
Patient identified self-management goal:  BG control, weight loss, get off metformin  Patients diabetes was improved but has been uncontrolled lately after missing a dose of Mounjaro. Recently got sample of Ozempic and injected 0.5mg on 5/31 and 6/6. No side effects as previously reported.  Patient's goal A1c is < 7%.  Is pt at goal? Yes, 6.9% on 5/31/24    Patient's SMBGs are reportedly elevated since stopping Mounjaro. Reports FBG are elevated but PPBG are generally within range  Complications: Insurance claims seem to be inaccurate and have not corrected deductible, so copays are still high. Patient reports he broke the Ozempic pen - unable to dial it anymore. May require further education  Rationale for plan: Patient's BG was within normal range with some elevation in FBG while on Mounjaro 7.5mg and metformin ER 1000mg BID. Tolerating Ozempic with no GI issues now but believes he broke the pen and may not be able to take the next dose. Plan was to increase Mounjaro to 10mg. BG seems to have improved with glimepiride but FBG still elevated. Will take glimepiride accordingly before breakfast and dinner in interim until insurance issues are cleared up. May require Farxiga due to lower copay and it may help with deductible after patient fills Vraylar before follow-up  Medication Changes:  START  Glimepiride 2mg - 0.5 tab (1mg) once daily before breakfast and 1 tab (2mg) before dinner  Metformin 500 mg - 2 tabs (1000 mg) twice daily with dinner  HOLD:   Mounjaro 10 mg once weekly (per pt, Dr. Youssef wants him to stay with Mounjaro 10mg once covered) Educated patient that if more doses are skipped, likely will need to restart titration at lower dose until able to afford Mounjaro again  Compliance at present is estimated to be good. Efforts to improve compliance (if necessary) will be directed at   dietary modifications: try to still eat throughout the day to avoid snacking overnight, increase fiber  increased exercise:  walking more (after dinner)  regular blood sugar monitoring: 3-4 times daily with tracking of FBG and BG before biggest meal and 2 hours after biggest meal.  Education Provided to Patient: What insurance informed me about his deductible, which should have been met by now. Patient will contact insurance. Glimepiride MOA/side effects/dosing, BG monitoring,   Follow-up: 6/14/24 @ 1pm Ozempic sample and education  PCP Follow-up: 12/2/24 for evaluation of allergic reaction and treatment. Due for labs (CMP, lipids. May consider A1c)

## 2024-06-14 ENCOUNTER — CLINICAL SUPPORT (OUTPATIENT)
Dept: PHARMACY | Facility: HOSPITAL | Age: 49
End: 2024-06-14
Payer: COMMERCIAL

## 2024-06-14 DIAGNOSIS — E11.9 TYPE 2 DIABETES MELLITUS WITHOUT COMPLICATION, WITHOUT LONG-TERM CURRENT USE OF INSULIN (MULTI): ICD-10-CM

## 2024-06-14 NOTE — PROGRESS NOTES
"Subjective   Patient ID: Danial Navarro Jr. is a 48 y.o. male who presents for Follow-up. Patient having trouble using Ozempic starter pen.     Assessment/Plan   Problem List Items Addressed This Visit       Type 2 diabetes mellitus (Multi)    Relevant Orders    Follow Up In Advanced Primary [facility] - Pharmacy       LAB RESULTS:  Lab Results   Component Value Date    HGBA1C 6.9 (A) 05/31/2024    HGBA1C 9.8 (A) 03/04/2024    HGBA1C 9.3 (A) 11/02/2023     Lab Results   Component Value Date    CREATININE 0.95 04/14/2023      Lab Results   Component Value Date    CHOL 138 04/14/2023    CHOL 219 (H) 01/10/2022    CHOL 131 09/17/2020     Lab Results   Component Value Date    HDL 35.0 (A) 04/14/2023    HDL 39.0 (A) 01/10/2022    HDL 34.0 (A) 09/17/2020       No results found for: \"LDLCALC\"  Lab Results   Component Value Date    TRIG 208 (H) 04/14/2023    TRIG 384 (H) 01/10/2022    TRIG 216 (H) 09/17/2020     Reviewed Ozempic admin in great length and patient verbalized understanding and understand his issue with previous administration. PharmD to follow-up in 4 weeks. 3 samples starter pens given to patient to allow more time to resolve ins. Deductible issue    Continue all meds under the continuation of care with the referring provider and clinical pharmacy team.         "

## 2024-07-07 DIAGNOSIS — E11.9 TYPE 2 DIABETES MELLITUS WITHOUT COMPLICATION, WITHOUT LONG-TERM CURRENT USE OF INSULIN (MULTI): ICD-10-CM

## 2024-07-08 RX ORDER — METFORMIN HYDROCHLORIDE 500 MG/1
1000 TABLET, EXTENDED RELEASE ORAL
Qty: 360 TABLET | Refills: 3 | Status: SHIPPED | OUTPATIENT
Start: 2024-07-08 | End: 2025-07-08

## 2024-07-09 ENCOUNTER — APPOINTMENT (OUTPATIENT)
Dept: PHARMACY | Facility: HOSPITAL | Age: 49
End: 2024-07-09
Payer: COMMERCIAL

## 2024-07-09 DIAGNOSIS — E11.9 TYPE 2 DIABETES MELLITUS WITHOUT COMPLICATION, WITHOUT LONG-TERM CURRENT USE OF INSULIN (MULTI): Primary | ICD-10-CM

## 2024-07-09 PROCEDURE — RXMED WILLOW AMBULATORY MEDICATION CHARGE

## 2024-07-09 RX ORDER — GLIMEPIRIDE 1 MG/1
3 TABLET ORAL EVERY EVENING
Qty: 90 TABLET | Refills: 0 | Status: SHIPPED | OUTPATIENT
Start: 2024-07-09 | End: 2024-08-14

## 2024-07-09 NOTE — PROGRESS NOTES
Pharmacist Clinic: Diabetes Management  Danial Navarro Jr. is a 48 y.o. male was referred to Clinical Pharmacy Team for diabetes management.   Referring Provider: Akbar Youssef DO     Patient is seeing psych who is now managing his vilazodone 20mg and Vraylar 1.5mg.     Current Outpatient Medications   Medication Instructions    atorvastatin (LIPITOR) 40 mg, oral, Daily    cariprazine (Vraylar) 1.5 mg capsule TAKE 1 CAPSULE BY MOUTH ONCE DAILY    famotidine (PEPCID) 20 mg, oral, Daily    glimepiride (AMARYL) 3 mg, oral, Every evening, Take with dinner. If blood sugar during daytime stays >180 for 1 week, take an additional 1 tablet daily at breakfast.    lisinopril 10 mg, oral, Daily    meloxicam (MOBIC) 7.5 mg, oral, Daily    metFORMIN XR (GLUCOPHAGE-XR) 1,000 mg, oral, 2 times daily (morning and late afternoon)    Mounjaro 10 mg, subcutaneous, Once Weekly    vilazodone (VIIBRYD) 20 mg, oral, Daily       Allergies   Allergen Reactions    Penicillins Unknown     Almost 50 years ago       [facility] - [place], [place] - [address] [address]  [address] [facility] [address]  Phone: [phone] Fax: [phone]    [facility] - [address] [address]  Phone: [phone] Fax: [phone]    [facility]  [address], [address]  [facility] [address]  Phone: [phone] Fax: [phone]     No issues reported in regards to adverse effects organization.    Diabetes  He presents for his initial diabetic visit. He has type 2 diabetes mellitus. His disease course has been improving (with compliance to metformin and Mounjaro). His home blood glucose trend is decreasing steadily.     HISTORY OF PRESENT ILLNESS  Approximate Date of Diagnosis: 10 years   Known complications due to diabetes included none    Diabetes    Current Diabetes Medications:   Metformin ER 500mg - 2 tabs (1000mg) BID with meals  Ozempic 0.5 mg weekly  (3 samples)  Glimepiride 2mg - 0.5 tab (1mg) once daily at breakfast and 1 (2mg) tab at dinner    Historical Diabetes Medications:  Janumet (rashes)   Ozempic (major GI effects but had accidentally started from 0.5mg)  Mounjaro 7.5mg weekly on Mon AM (last dose on , unable to obtain due to ins)    SMBG Measurements  Patient is using: glucometer CaresensN   The patient is currently checking the blood glucose 3-4 times per day.  Patient does not report symptoms of hypoglycemia. Patient reports none.  Hypoglycemia frequency: none  Hypoglycemia awareness: No   Patient does not report symptoms of hyperglycemia. Patient previously reports blurry vision, excessive thirst, fatigue, and polyuria. He has not noticed symptoms with current regimen  FBs  PPBs, does not go over 180 mg/dl    Diet: fast food (apples, vegetables/salads), but patient is working on watching his diet. Appetite has decreased, so lower portions. Not snacking as much at night  Breakfast: wheat toast  Lunch: lunch meat sandwich, salad, apple  Dinner: biggest meal of the day  Snacks: often at night  Fluids: 1 bottle of water/day    Weight: 200 lbs (3/4/24) --> 182 lbs (24)    Physical Activity   Not much. Has a gym and very nice sight-seeing location at [facility]. Is considering walking every night with wife once weather is better    Pertinent PMH Review:  PMH of Pancreatitis: No  PMH of Retinopathy: No  PMH of Urinary Tract Infections: No  PMH of MTC: No    AFFORDABILITY/ADHERENCE - Has high deductible ($7500), so none of his medications are covered until it is met. Currently, having insurance issues likely from their end. Has already met the deductible with prescription costs of $8373.69 using copay cards, but insurance claims he has medical claims he has not paid for ($1876.40) so remaining deductible is supposedly $6497.29.   Andre Bright (unable to afford at the moment while insurance still is claiming he owes for medical claim  which he denies any known balance)  Test supplies not covered under insurance and don't go towards deductible. Pays for OTC Caresens test strips 50 ct $12.99/month and other tet supplies $7-10/month. Rx at [facility]:  Accuchek test strips 100ct: $46.35  Lancets 100ct: $6.31  Lancet (accuchek softclix 100ct): $14.42    LAB REVIEW   Lab Results   Component Value Date    CREATININE 0.95 04/14/2023      Lab Results   Component Value Date    HGBA1C 6.9 (A) 05/31/2024    HGBA1C 9.8 (A) 03/04/2024    HGBA1C 9.3 (A) 11/02/2023     Lab Results   Component Value Date    TRIG 208 (H) 04/14/2023    TRIG 384 (H) 01/10/2022    TRIG 216 (H) 09/17/2020       Secondary Prevention  Statin? Yes  ACE-I/ARB? Yes  Aspirin? No  Last eye exam: 2 years  Last diabetic foot exam:     The 10-year ASCVD risk score (Kate CONRAD, et al., 2019) is: 5.8%    Values used to calculate the score:      Age: 48 years      Sex: Male      Is Non- : No      Diabetic: Yes      Tobacco smoker: No      Systolic Blood Pressure: 118 mmHg      Is BP treated: Yes      HDL Cholesterol: 35 mg/dL      Total Cholesterol: 138 mg/dL       Assessment/Plan   Problem List Items Addressed This Visit       Type 2 diabetes mellitus (Multi) - Primary     Patient identified self-management goal:  BG control, weight loss, get off metformin  Patients diabetes was improved but has been uncontrolled lately after missing a dose of Mounjaro. Recently got 3 samples of Ozempic. Still no side effects as previously reported.  Patient's goal A1c is < 7%.  Is pt at goal? Yes, 6.9% on 5/31/24    Patient's SMBGs are reportedly elevated since stopping Mounjaro and taking lower johnson of Ozempic while in interim of insurance issues to cover Mounjaro. Reports FBG are elevated but PPBG are generally within range  Complications: Insurance claims seem to be inaccurate and have not corrected deductible, so copays are still high.   Rationale for plan: Patient's BG was within  normal range with some elevation in FBG while on Mounjaro 7.5mg and metformin ER 1000mg BID. Tolerating Ozempic with no GI issues after receiving education. Plan was to increase Mounjaro to 10mg. BG seems to have improved with glimepiride but FBG still elevated. Will take glimepiride accordingly before dinner and adjust as needed in interim until insurance issues are cleared up. Given his numbers, will attempt glimepiride 3 mg at dinner. If PPBG is elevated after 1 week, he can increase to additional 1mg in the morning. May require Farxiga due to lower copay and it may help with deductible after patient fills Vraylar before follow-up  Medication Changes:  START  Glimepiride 1mg - 3 tab (3mg) with dinner, If PPBG >180 for 1 week, take 1 tab (1mg) once daily before breakfast   CONTINUE  Metformin 500 mg - 2 tabs (1000 mg) twice daily with dinner  Ozempic 0.5mg weekly  HOLD:   Mounjaro 10 mg once weekly (per pt, Dr. Youssef wants him to stay with Mounjaro 10mg once covered) Educated patient that if more doses are skipped, likely will need to restart titration at lower dose until able to afford Mounjaro again  Compliance at present is estimated to be good. Efforts to improve compliance (if necessary) will be directed at   dietary modifications: try to still eat throughout the day to avoid snacking overnight, increase fiber  increased exercise: walking more (after dinner)  regular blood sugar monitoring: 3-4 times daily with tracking of FBG and BG before biggest meal and 2 hours after biggest meal.  Education Provided to Patient: What insurance informed me about his deductible, which should have been met by now. Patient will contact insurance. Glimepiride MOA/side effects/dosing, BG monitoring,   Follow-up: 7/22 @ 11:30pm Ozempic sample and education  PCP Follow-up: 12/2/24 for evaluation of allergic reaction and treatment. Due for labs (CMP, lipids. May consider A1c)         Relevant Medications    glimepiride (AmaryL) 1  mg tablet    Other Relevant Orders    Follow Up In Advanced Primary Care - Pharmacy       Notify your healthcare provider if you have any of the following:  -Diarrhea or vomiting for more than six hours  -Blood sugar >300 mg/dL more than once  -Low blood sugar (<70 mg/dL)  -Questions about your medications    Thank you,  Carrie Hebert, PharmD    Continue all meds under the continuation of care with the referring provider and clinical pharmacy team.  Verbal consent to manage patient's drug therapy was obtained. They were informed they may decline to participate or withdraw from participation in pharmacy services at any time.

## 2024-07-09 NOTE — ASSESSMENT & PLAN NOTE
Patient identified self-management goal:  BG control, weight loss, get off metformin  Patients diabetes was improved but has been uncontrolled lately after missing a dose of Mounjaro. Recently got 3 samples of Ozempic. Still no side effects as previously reported.  Patient's goal A1c is < 7%.  Is pt at goal? Yes, 6.9% on 5/31/24    Patient's SMBGs are reportedly elevated since stopping Mounjaro and taking lower johnson of Ozempic while in interim of insurance issues to cover Mounjaro. Reports FBG are elevated but PPBG are generally within range  Complications: Insurance claims seem to be inaccurate and have not corrected deductible, so copays are still high.   Rationale for plan: Patient's BG was within normal range with some elevation in FBG while on Mounjaro 7.5mg and metformin ER 1000mg BID. Tolerating Ozempic with no GI issues after receiving education. Plan was to increase Mounjaro to 10mg. BG seems to have improved with glimepiride but FBG still elevated. Will take glimepiride accordingly before dinner and adjust as needed in interim until insurance issues are cleared up. Given his numbers, will attempt glimepiride 3 mg at dinner. If PPBG is elevated after 1 week, he can increase to additional 1mg in the morning. May require Farxiga due to lower copay and it may help with deductible after patient fills Vraylar before follow-up  Medication Changes:  START  Glimepiride 1mg - 3 tab (3mg) with dinner, If PPBG >180 for 1 week, take 1 tab (1mg) once daily before breakfast   CONTINUE  Metformin 500 mg - 2 tabs (1000 mg) twice daily with dinner  Ozempic 0.5mg weekly  HOLD:   Mounjaro 10 mg once weekly (per pt, Dr. Youssef wants him to stay with Mounjaro 10mg once covered) Educated patient that if more doses are skipped, likely will need to restart titration at lower dose until able to afford Mounjaro again  Compliance at present is estimated to be good. Efforts to improve compliance (if necessary) will be directed at    dietary modifications: try to still eat throughout the day to avoid snacking overnight, increase fiber  increased exercise: walking more (after dinner)  regular blood sugar monitoring: 3-4 times daily with tracking of FBG and BG before biggest meal and 2 hours after biggest meal.  Education Provided to Patient: What insurance informed me about his deductible, which should have been met by now. Patient will contact insurance. Glimepiride MOA/side effects/dosing, BG monitoring,   Follow-up: 7/22 @ 11:30pm Ozempic sample and education  PCP Follow-up: 12/2/24 for evaluation of allergic reaction and treatment. Due for labs (CMP, lipids. May consider A1c)

## 2024-07-10 PROCEDURE — RXMED WILLOW AMBULATORY MEDICATION CHARGE

## 2024-07-15 ENCOUNTER — PHARMACY VISIT (OUTPATIENT)
Dept: PHARMACY | Facility: CLINIC | Age: 49
End: 2024-07-15
Payer: COMMERCIAL

## 2024-07-22 ENCOUNTER — APPOINTMENT (OUTPATIENT)
Dept: PHARMACY | Facility: HOSPITAL | Age: 49
End: 2024-07-22
Payer: COMMERCIAL

## 2024-07-22 DIAGNOSIS — E11.9 TYPE 2 DIABETES MELLITUS WITHOUT COMPLICATION, WITHOUT LONG-TERM CURRENT USE OF INSULIN (MULTI): ICD-10-CM

## 2024-07-22 NOTE — PROGRESS NOTES
Pharmacist Clinic: Diabetes Management  Danial Navarro Jr. is a 48 y.o. male was referred to Clinical Pharmacy Team for diabetes management.   Referring Provider: Akbar Youssef DO     Patient is seeing psych who is now managing his vilazodone 20mg and Vraylar 1.5mg.     Current Outpatient Medications   Medication Instructions    atorvastatin (LIPITOR) 40 mg, oral, Daily    cariprazine (Vraylar) 1.5 mg capsule TAKE 1 CAPSULE BY MOUTH ONCE DAILY    famotidine (PEPCID) 20 mg, oral, Daily    glimepiride (AMARYL) 4 mg, oral, Every evening, Take with dinner.    lisinopril 10 mg, oral, Daily    meloxicam (MOBIC) 7.5 mg, oral, Daily    metFORMIN XR (GLUCOPHAGE-XR) 1,000 mg, oral, 2 times daily (morning and late afternoon)    Mounjaro 10 mg, subcutaneous, Once Weekly    vilazodone (VIIBRYD) 20 mg, oral, Daily       Allergies   Allergen Reactions    Penicillins Unknown     Almost 50 years ago       [id number] - [place], [place] - [address] [address]  [address] [facility] [address]  Phone: [phone] Fax: [phone]    [facility] - [address] [address]  Phone: [phone] Fax: [phone]    [facility]  [address], [address]  [place] [address]  Phone: [phone] Fax: [phone]     No issues reported in regards to adverse effects organization.    Diabetes  He presents for his initial diabetic visit. He has type 2 diabetes mellitus. His disease course has been improving (with compliance to metformin and Mounjaro). His home blood glucose trend is decreasing steadily.     HISTORY OF PRESENT ILLNESS  Approximate Date of Diagnosis: 10 years   Known complications due to diabetes included none    Diabetes    Current Diabetes Medications:   Metformin ER 500mg - 2 tabs (1000mg) BID with meals  Ozempic 0.5 mg weekly on Fri (3 samples)  Glimepiride 3 tab (3mg) with dinner, If PPBG >180 for 1 week, take 1 tab (1mg)  once daily before breakfast     Historical Diabetes Medications:  Janumet (rashes)   Ozempic (major GI effects but had accidentally started from 0.5mg)  Mounjaro 7.5mg weekly on Mon AM (last dose on 5/21, unable to obtain due to ins)    SMBG Measurements  Patient is using: glucometer CaresensN   The patient is currently checking the blood glucose 3-4 times per day.  Patient does not report symptoms of hypoglycemia. Patient reports none.  Hypoglycemia frequency: none  Hypoglycemia awareness: No   Patient does not report symptoms of hyperglycemia. Patient previously reports blurry vision, excessive thirst, fatigue, and polyuria. He has not noticed symptoms with current regimen  Overall FBGs and PPBGs have been similar and stable:  9-Jul FBG Before lunch After dinner, before bedtime Note   10-Jul 166 126 153    11-Jul 177 143 156    12-Jul 160 181 175    13-Jul 179 144 151    14-Jul 162 156 171    15-Jul 162 119 161    16-Jul 132 190 143 190 after unhealthy lunch   17-Jul 163 151 171        Diet: fast food (apples, vegetables/salads), but patient is working on watching his diet. Appetite has decreased, so lower portions. Not snacking as much at night  Breakfast: wheat toast  Lunch: lunch meat sandwich, salad, apple  Dinner: biggest meal of the day  Snacks: often at night  Fluids: 1 bottle of water/day    Weight: 200 lbs (3/4/24) --> 182 lbs (5/21/24)    Physical Activity   Not much. Has a gym and very nice sight-seeing location at [facility]. Is considering walking every night with wife once weather is better    Pertinent PMH Review:  PMH of Pancreatitis: No  PMH of Retinopathy: No  PMH of Urinary Tract Infections: No  PMH of MTC: No    AFFORDABILITY/ADHERENCE - Has high deductible ($7500), so none of his medications are covered until it is met. Currently, having insurance issues likely from their end. Has already met the deductible with prescription costs of $8373.69 using copay cards, but insurance claims he has medical  claims he has not paid for ($1876.40) so remaining deductible is supposedly $6497.29.   TedlaVignesh halesusana (unable to afford at the moment while insurance still is claiming he owes for medical claim which he denies any known balance)  Test supplies not covered under insurance and don't go towards deductible. Pays for OTC Caresens test strips 50 ct $12.99/month and other tet supplies $7-10/month. Rx at [facility]:  Accuchek test strips 100ct: $46.35  Lancets 100ct: $6.31  Lancet (accuchek softclix 100ct): $14.42    LAB REVIEW   Lab Results   Component Value Date    CREATININE 0.95 04/14/2023      Lab Results   Component Value Date    HGBA1C 6.9 (A) 05/31/2024    HGBA1C 9.8 (A) 03/04/2024    HGBA1C 9.3 (A) 11/02/2023     Lab Results   Component Value Date    TRIG 208 (H) 04/14/2023    TRIG 384 (H) 01/10/2022    TRIG 216 (H) 09/17/2020       Secondary Prevention  Statin? Yes  ACE-I/ARB? Yes  Aspirin? No  Last eye exam: 2 years  Last diabetic foot exam:     The 10-year ASCVD risk score (Kate CONRAD, et al., 2019) is: 5.8%    Values used to calculate the score:      Age: 48 years      Sex: Male      Is Non- : No      Diabetic: Yes      Tobacco smoker: No      Systolic Blood Pressure: 118 mmHg      Is BP treated: Yes      HDL Cholesterol: 35 mg/dL      Total Cholesterol: 138 mg/dL       Assessment/Plan   Problem List Items Addressed This Visit       Type 2 diabetes mellitus (Multi)     Patient identified self-management goal:  BG control, weight loss, get off metformin  Patients diabetes was improved but has been uncontrolled lately after missing a dose of Mounjaro. Recently got 3 samples of Ozempic. Still no side effects as previously reported.  Patient's goal A1c is < 7%.  Is pt at goal? Yes, 6.9% on 5/31/24    Patient's SMBGs are reportedly elevated since stopping Mounjaro and taking lower johnson of Ozempic while in interim of insurance issues to cover Mounjaro. Reports FBG are elevated but PPBG are  generally within range  Complications: Insurance claims seem to be inaccurate and have not corrected deductible, so copays are still high.   Rationale for plan: Patient's BG was within normal range with some elevation in FBG while on Mounjaro 7.5mg and metformin ER 1000mg BID. Tolerating Ozempic with no GI issues after receiving education and does notice effectiveness on his BG. Plan was to increase Mounjaro to 10mg. BG seems to have improved with glimepiride but FBG still elevated. Will take glimepiride accordingly with dinner and adjust as needed in interim until insurance issues are cleared up but patient keeps getting checks from insurance company as though he is overpaying. Given his numbers, will attempt glimepiride 4 mg at dinner. May require Farxiga due to lower copay and it may help with deductible after patient fills Vraylar before follow-up  Medication Changes:  START  Glimepiride 4mg with dinner  CONTINUE  Metformin 500 mg - 2 tabs (1000 mg) twice daily with dinner  Ozempic 0.5mg weekly (samples end on 8/30)  HOLD:   Mounjaro 10 mg once weekly (per pt, Dr. Youssef wants him to stay with Mounjaro 10mg once covered) Educated patient that if more doses are skipped, likely will need to restart titration at lower dose until able to afford Mounjaro again  Compliance at present is estimated to be good. Efforts to improve compliance (if necessary) will be directed at   dietary modifications: try to still eat throughout the day to avoid snacking overnight, increase fiber  increased exercise: walking more (after dinner)  regular blood sugar monitoring: 3-4 times daily with tracking of FBG and BG before biggest meal and 2 hours after biggest meal.  Education Provided to Patient: What insurance informed me about his deductible, which should have been met by now. Patient will contact insurance. Glimepiride MOA/side effects/dosing, BG monitoring,   Follow-up: 8/28 @ 10am Ozempic titration and education  PCP Follow-up:  12/2/24 Due for labs (CMP, lipids. May consider A1c)         Relevant Medications    glimepiride (Amaryl) 4 mg tablet    Other Relevant Orders    Follow Up In [facility] - Pharmacy         Notify your healthcare provider if you have any of the following:  -Diarrhea or vomiting for more than six hours  -Blood sugar >300 mg/dL more than once  -Low blood sugar (<70 mg/dL)  -Questions about your medications    Thank you,  Carrie Hebert, PharmD    Continue all meds under the continuation of care with the referring provider and clinical pharmacy team.  Verbal consent to manage patient's drug therapy was obtained. They were informed they may decline to participate or withdraw from participation in pharmacy services at any time.

## 2024-07-23 PROCEDURE — RXMED WILLOW AMBULATORY MEDICATION CHARGE

## 2024-07-23 RX ORDER — GLIMEPIRIDE 4 MG/1
4 TABLET ORAL EVERY EVENING
Qty: 30 TABLET | Refills: 1 | Status: SHIPPED | OUTPATIENT
Start: 2024-07-23

## 2024-07-23 NOTE — ASSESSMENT & PLAN NOTE
Patient identified self-management goal:  BG control, weight loss, get off metformin  Patients diabetes was improved but has been uncontrolled lately after missing a dose of Mounjaro. Recently got 3 samples of Ozempic. Still no side effects as previously reported.  Patient's goal A1c is < 7%.  Is pt at goal? Yes, 6.9% on 5/31/24    Patient's SMBGs are reportedly elevated since stopping Mounjaro and taking lower johnson of Ozempic while in interim of insurance issues to cover Mounjaro. Reports FBG are elevated but PPBG are generally within range  Complications: Insurance claims seem to be inaccurate and have not corrected deductible, so copays are still high.   Rationale for plan: Patient's BG was within normal range with some elevation in FBG while on Mounjaro 7.5mg and metformin ER 1000mg BID. Tolerating Ozempic with no GI issues after receiving education and does notice effectiveness on his BG. Plan was to increase Mounjaro to 10mg. BG seems to have improved with glimepiride but FBG still elevated. Will take glimepiride accordingly with dinner and adjust as needed in interim until insurance issues are cleared up but patient keeps getting checks from insurance company as though he is overpaying. Given his numbers, will attempt glimepiride 4 mg at dinner. May require Farxiga due to lower copay and it may help with deductible after patient fills Vraylar before follow-up  Medication Changes:  START  Glimepiride 4mg with dinner  CONTINUE  Metformin 500 mg - 2 tabs (1000 mg) twice daily with dinner  Ozempic 0.5mg weekly (samples end on 8/30)  HOLD:   Mounjaro 10 mg once weekly (per pt, Dr. Youssef wants him to stay with Mounjaro 10mg once covered) Educated patient that if more doses are skipped, likely will need to restart titration at lower dose until able to afford Mounjaro again  Compliance at present is estimated to be good. Efforts to improve compliance (if necessary) will be directed at   dietary modifications: try to  still eat throughout the day to avoid snacking overnight, increase fiber  increased exercise: walking more (after dinner)  regular blood sugar monitoring: 3-4 times daily with tracking of FBG and BG before biggest meal and 2 hours after biggest meal.  Education Provided to Patient: What insurance informed me about his deductible, which should have been met by now. Patient will contact insurance. Glimepiride MOA/side effects/dosing, BG monitoring,   Follow-up: 8/28 @ 10am Ozempic titration and education  PCP Follow-up: 12/2/24 Due for labs (CMP, lipids. May consider A1c)

## 2024-07-24 ENCOUNTER — PHARMACY VISIT (OUTPATIENT)
Dept: PHARMACY | Facility: CLINIC | Age: 49
End: 2024-07-24
Payer: COMMERCIAL

## 2024-08-09 ENCOUNTER — PHARMACY VISIT (OUTPATIENT)
Dept: PHARMACY | Facility: CLINIC | Age: 49
End: 2024-08-09
Payer: COMMERCIAL

## 2024-08-09 PROCEDURE — RXMED WILLOW AMBULATORY MEDICATION CHARGE

## 2024-08-21 PROCEDURE — RXMED WILLOW AMBULATORY MEDICATION CHARGE

## 2024-08-28 ENCOUNTER — APPOINTMENT (OUTPATIENT)
Dept: PHARMACY | Facility: HOSPITAL | Age: 49
End: 2024-08-28
Payer: COMMERCIAL

## 2024-08-28 DIAGNOSIS — E11.9 TYPE 2 DIABETES MELLITUS WITHOUT COMPLICATION, WITHOUT LONG-TERM CURRENT USE OF INSULIN (MULTI): ICD-10-CM

## 2024-08-28 NOTE — PROGRESS NOTES
"Subjective   Patient ID: Danial Navarro Jr. is a 48 y.o. male who presents for Follow-up (DMII).    Assessment/Plan   Problem List Items Addressed This Visit       Type 2 diabetes mellitus (Multi)       LAB RESULTS:  Lab Results   Component Value Date    HGBA1C 6.9 (A) 05/31/2024    HGBA1C 9.8 (A) 03/04/2024    HGBA1C 9.3 (A) 11/02/2023     Lab Results   Component Value Date    CREATININE 0.95 04/14/2023      Lab Results   Component Value Date    CHOL 138 04/14/2023    CHOL 219 (H) 01/10/2022    CHOL 131 09/17/2020     Lab Results   Component Value Date    HDL 35.0 (A) 04/14/2023    HDL 39.0 (A) 01/10/2022    HDL 34.0 (A) 09/17/2020       No results found for: \"LDLCALC\"  Lab Results   Component Value Date    TRIG 208 (H) 04/14/2023    TRIG 384 (H) 01/10/2022    TRIG 216 (H) 09/17/2020       Continue all meds under the continuation of care with the referring provider and clinical pharmacy team.    BG has been stable and controlled. Follow-up 3 months. Ozempic samples will be supplied to patient, if available until insurance issue resolved.     "

## 2024-09-03 ENCOUNTER — PHARMACY VISIT (OUTPATIENT)
Dept: PHARMACY | Facility: CLINIC | Age: 49
End: 2024-09-03
Payer: COMMERCIAL

## 2024-09-09 PROCEDURE — RXMED WILLOW AMBULATORY MEDICATION CHARGE

## 2024-09-10 ENCOUNTER — PHARMACY VISIT (OUTPATIENT)
Dept: PHARMACY | Facility: CLINIC | Age: 49
End: 2024-09-10
Payer: COMMERCIAL

## 2024-09-25 DIAGNOSIS — E78.5 HYPERLIPIDEMIA, UNSPECIFIED HYPERLIPIDEMIA TYPE: ICD-10-CM

## 2024-09-25 DIAGNOSIS — E11.9 TYPE 2 DIABETES MELLITUS WITHOUT COMPLICATION, WITHOUT LONG-TERM CURRENT USE OF INSULIN (MULTI): ICD-10-CM

## 2024-09-25 RX ORDER — ATORVASTATIN CALCIUM 40 MG/1
40 TABLET, FILM COATED ORAL DAILY
Qty: 90 TABLET | Refills: 3 | Status: SHIPPED | OUTPATIENT
Start: 2024-09-25

## 2024-09-25 RX ORDER — GLIMEPIRIDE 4 MG/1
4 TABLET ORAL
Qty: 90 TABLET | Refills: 3 | Status: SHIPPED | OUTPATIENT
Start: 2024-09-25 | End: 2025-09-25

## 2024-09-30 ENCOUNTER — TELEPHONE (OUTPATIENT)
Dept: PRIMARY CARE | Facility: CLINIC | Age: 49
End: 2024-09-30
Payer: COMMERCIAL

## 2024-09-30 NOTE — TELEPHONE ENCOUNTER
"Patient called, stated he has an Appointment with you on November , 27\" th 24 Virtual and He would like you to call him regarding His Medications, please contact him at - [phone]   Thank You.   "

## 2024-10-01 ENCOUNTER — TELEMEDICINE (OUTPATIENT)
Dept: PHARMACY | Facility: HOSPITAL | Age: 49
End: 2024-10-01
Payer: COMMERCIAL

## 2024-10-01 DIAGNOSIS — E11.9 TYPE 2 DIABETES MELLITUS WITHOUT COMPLICATION, WITHOUT LONG-TERM CURRENT USE OF INSULIN (MULTI): Primary | ICD-10-CM

## 2024-10-01 RX ORDER — TIRZEPATIDE 2.5 MG/.5ML
2.5 INJECTION, SOLUTION SUBCUTANEOUS WEEKLY
Qty: 6 ML | Refills: 0 | Status: SHIPPED | OUTPATIENT
Start: 2024-10-01

## 2024-10-01 NOTE — PROGRESS NOTES
"Pharmacist Clinic: Diabetes Management  Danial Navarro Jr. is a 48 y.o. male who presents for a follow-up evaluation of his Type 2 diabetes mellitus.   Referring Provider: DO HUY Cameron    Allergies   Allergen Reactions    Penicillins Unknown     Almost 50 years ago       LAB REVIEW   Glucose (mg/dL)   Date Value   04/14/2023 222 (H)   01/10/2022 155 (H)   09/17/2020 219 (H)     POC HEMOGLOBIN A1c (%)   Date Value   05/31/2024 6.9 (A)   03/04/2024 9.8 (A)   11/02/2023 9.3 (A)     Urea Nitrogen (mg/dL)   Date Value   04/14/2023 12   01/10/2022 13   09/17/2020 15     Creatinine (mg/dL)   Date Value   04/14/2023 0.95   01/10/2022 1.00   09/17/2020 0.92     No results found for: \"ALBUR\", \"[id number]\"  Lab Results   Component Value Date    CHOL 138 04/14/2023    CHOL 219 (H) 01/10/2022    CHOL 131 09/17/2020     Lab Results   Component Value Date    HDL 35.0 (A) 04/14/2023    HDL 39.0 (A) 01/10/2022    HDL 34.0 (A) 09/17/2020     No results found for: \"LDLCALC\"  Lab Results   Component Value Date    TRIG 208 (H) 04/14/2023    TRIG 384 (H) 01/10/2022    TRIG 216 (H) 09/17/2020       The 10-year ASCVD risk score (Kate CONRAD, et al., 2019) is: 5.8%    Values used to calculate the score:      Age: 48 years      Sex: Male      Is Non- : No      Diabetic: Yes      Tobacco smoker: No      Systolic Blood Pressure: 118 mmHg      Is BP treated: Yes      HDL Cholesterol: 35 mg/dL      Total Cholesterol: 138 mg/dL     DIABETES ASSESSMENT    Current Diabetes Medications:   Metformin ER 500mg - 2 tabs (1000mg) BID with meals  Ozempic 2.5 mg weekly on Fri (samples)  Glimepiride 4mg once daily with dinner     Historical Diabetes Medications:  Janumet (rashes)   Mounjaro 7.5mg weekly on Mon AM (last dose on 5/21, unable to obtain due to ins)     SMBG Measurements  Patient is using: glucometer CaresensN   The patient is currently checking the blood glucose 3-4 times per day.  Patient does not report symptoms " of hypoglycemia. Patient reports none.  Hypoglycemia frequency: none  Hypoglycemia awareness: No   Patient does not report symptoms of hyperglycemia. Patient previously reports blurry vision, excessive thirst, fatigue, and polyuria. He has not noticed symptoms with current regimen    SECONDARY PREVENTION  - Statin? Yes  - ACE-I/ARB? Yes  - Aspirin? No  - Last eye exam:  - Last diabetic foot exam:    ADHERENCE/AFFORDABILITY  - High deductible. Does not qualify for  PAP/VAF. Insurance is not properly counting all of his out of pocket expenses towards deductible.    Assessment/Plan   Problem List Items Addressed This Visit       Type 2 diabetes mellitus - Primary     Patient identified self-management goal:  BG control, weight loss, get off metformin  Patients diabetes was improved with Mounjaro, but cost issues with insurance made accessibility difficult. Received samples of Ozempic 0.25mg  Patient's goal A1c is < 7%.  Is pt at goal? Yes, 6.9% on 5/31/24    Patient's SMBGs are reportedly elevated recently.   Complications: Insurance claims seem to be inaccurate and have not corrected deductible, so copays are still high. Patient is willing to pay remainder of deductible  Rationale for plan: Patient's BG was within normal range with some elevation in FBG while on Mounjaro 7.5mg and metformin ER 1000mg BID. Plan was to increase Mounjaro to 10mg. BG seems to have improved with glimepiride but FBG was still elevated. Prefers Mounjaro for effectiveness and tolerance. Willing to pay for rest of deductible and obtain lower cost  Medication Changes:  START  Mounjaro 2.5mg weekly  CONTINUE  Metformin 500 mg - 2 tabs (1000 mg) twice daily with dinner  DECREASE:   Glimepiride 2mg with dinner  Compliance at present is estimated to be good. Efforts to improve compliance (if necessary) will be directed at   dietary modifications: try to still eat throughout the day to avoid snacking overnight, increase fiber  increased exercise:  walking more (after dinner)  regular blood sugar monitoring: 3-4 times daily with tracking of FBG and BG before biggest meal and 2 hours after biggest meal.  Education Provided to Patient: MOA/side effects/dosing/administration for glimepiride and Mounjaro, BG monitoring.  Follow-up: 11/1 @ 9:30am Moounjaro titration and education  PCP Follow-up: 12/2/24 Due for labs (CMP, lipids, A1c)         Relevant Medications    tirzepatide (Mounjaro) 2.5 mg/0.5 mL pen injector    Other Relevant Orders    Follow Up In Advanced Primary Care - Pharmacy       Notify your healthcare provider if you have any of the following:  -Diarrhea or vomiting for more than six hours  -Blood sugar >300 mg/dL more than once  -Low blood sugar (<70 mg/dL)  -Questions about your medications    Thank you,  Carrie Hebert, PharmD    Continue all meds under the continuation of care with the referring provider and clinical pharmacy team.  Verbal consent to manage patient's drug therapy was obtained. They were informed they may decline to participate or withdraw from participation in pharmacy services at any time.

## 2024-10-01 NOTE — PATIENT INSTRUCTIONS
Medication Changes:  START  Mounjaro 2.5mg weekly  CONTINUE  Metformin 500 mg - 2 tabs (1000 mg) twice daily with dinner  DECREASE:   Glimepiride 2mg with dinner  Efforts to improve compliance (if necessary) will be directed at   dietary modifications: try to still eat throughout the day to avoid snacking overnight, increase fiber, protein, vegetables. Decrease carbohydrates  increased exercise: walking more (after dinner) to help lower morning blood sugar  regular blood sugar monitoring: 3-4 times daily with tracking of FBG and BG before biggest meal and 2 hours after biggest meal.  Education Provided to Patient: MOA/side effects/dosing/administration for glimepiride and Mounjaro, BG monitoring.  Follow-up: 11/1 @ 9:30am Mounjaro tolerance/titration and med adjustment  PCP Follow-up: 12/2/24 Due for labs (CMP, lipids, A1c)

## 2024-10-01 NOTE — ASSESSMENT & PLAN NOTE
Patient identified self-management goal:  BG control, weight loss, get off metformin  Patients diabetes was improved with Mounjaro, but cost issues with insurance made accessibility difficult. Received samples of Ozempic 0.25mg  Patient's goal A1c is < 7%.  Is pt at goal? Yes, 6.9% on 5/31/24    Patient's SMBGs are reportedly elevated recently.   Complications: Insurance claims seem to be inaccurate and have not corrected deductible, so copays are still high. Patient is willing to pay remainder of deductible  Rationale for plan: Patient's BG was within normal range with some elevation in FBG while on Mounjaro 7.5mg and metformin ER 1000mg BID. Plan was to increase Mounjaro to 10mg. BG seems to have improved with glimepiride but FBG was still elevated. Prefers Mounjaro for effectiveness and tolerance. Willing to pay for rest of deductible and obtain lower cost  Medication Changes:  START  Mounjaro 2.5mg weekly  CONTINUE  Metformin 500 mg - 2 tabs (1000 mg) twice daily with dinner  DECREASE:   Glimepiride 2mg with dinner  Compliance at present is estimated to be good. Efforts to improve compliance (if necessary) will be directed at   dietary modifications: try to still eat throughout the day to avoid snacking overnight, increase fiber  increased exercise: walking more (after dinner)  regular blood sugar monitoring: 3-4 times daily with tracking of FBG and BG before biggest meal and 2 hours after biggest meal.  Education Provided to Patient: MOA/side effects/dosing/administration for glimepiride and Mounjaro, BG monitoring.  Follow-up: 11/1 @ 9:30am Moounjaro titration and education  PCP Follow-up: 12/2/24 Due for labs (CMP, lipids, A1c)

## 2024-10-01 NOTE — Clinical Note
Patient called about running out of Ozempic but is willing to pay remainder of his deductible. Restarting him on Mounjaro and lowering glimepiride. Will follow-up in a month and consider titrating Mounjaro depending on tolerance and coverage. Thanks! Carrie Hebert, PharmD

## 2024-10-07 PROCEDURE — RXMED WILLOW AMBULATORY MEDICATION CHARGE

## 2024-10-08 ENCOUNTER — PHARMACY VISIT (OUTPATIENT)
Dept: PHARMACY | Facility: CLINIC | Age: 49
End: 2024-10-08
Payer: COMMERCIAL

## 2024-11-01 ENCOUNTER — APPOINTMENT (OUTPATIENT)
Dept: PHARMACY | Facility: HOSPITAL | Age: 49
End: 2024-11-01
Payer: COMMERCIAL

## 2024-11-01 VITALS — WEIGHT: 188.6 LBS | BODY MASS INDEX: 26.3 KG/M2

## 2024-11-01 DIAGNOSIS — E11.9 TYPE 2 DIABETES MELLITUS WITHOUT COMPLICATION, WITHOUT LONG-TERM CURRENT USE OF INSULIN (MULTI): ICD-10-CM

## 2024-11-01 PROCEDURE — RXMED WILLOW AMBULATORY MEDICATION CHARGE

## 2024-11-01 RX ORDER — TIRZEPATIDE 5 MG/.5ML
5 INJECTION, SOLUTION SUBCUTANEOUS WEEKLY
Qty: 2 ML | Refills: 2 | Status: SHIPPED | OUTPATIENT
Start: 2024-11-01

## 2024-11-02 PROCEDURE — RXMED WILLOW AMBULATORY MEDICATION CHARGE

## 2024-11-04 ENCOUNTER — PHARMACY VISIT (OUTPATIENT)
Dept: PHARMACY | Facility: CLINIC | Age: 49
End: 2024-11-04
Payer: COMMERCIAL

## 2024-11-13 ENCOUNTER — APPOINTMENT (OUTPATIENT)
Dept: PHARMACY | Facility: HOSPITAL | Age: 49
End: 2024-11-13
Payer: COMMERCIAL

## 2024-11-13 DIAGNOSIS — E11.9 TYPE 2 DIABETES MELLITUS WITHOUT COMPLICATION, WITHOUT LONG-TERM CURRENT USE OF INSULIN (MULTI): ICD-10-CM

## 2024-11-13 RX ORDER — TIRZEPATIDE 7.5 MG/.5ML
7.5 INJECTION, SOLUTION SUBCUTANEOUS WEEKLY
Qty: 2 ML | Refills: 1 | Status: SHIPPED | OUTPATIENT
Start: 2024-11-13

## 2024-11-13 NOTE — PROGRESS NOTES
"Pharmacist Clinic: Diabetes Management  Danial Navarro Jr. is a 49 y.o. male who presents for a follow-up evaluation of his Type 2 diabetes mellitus.   Referring Provider: DO HUY Cameron    Allergies   Allergen Reactions    Penicillins Unknown     Almost 50 years ago       LAB REVIEW   Glucose (mg/dL)   Date Value   04/14/2023 222 (H)   01/10/2022 155 (H)   09/17/2020 219 (H)     POC HEMOGLOBIN A1c (%)   Date Value   05/31/2024 6.9 (A)   03/04/2024 9.8 (A)   11/02/2023 9.3 (A)     Urea Nitrogen (mg/dL)   Date Value   04/14/2023 12   01/10/2022 13   09/17/2020 15     Creatinine (mg/dL)   Date Value   04/14/2023 0.95   01/10/2022 1.00   09/17/2020 0.92     No results found for: \"ALBUR\", \"[id number]\"  Lab Results   Component Value Date    CHOL 138 04/14/2023    CHOL 219 (H) 01/10/2022    CHOL 131 09/17/2020     Lab Results   Component Value Date    HDL 35.0 (A) 04/14/2023    HDL 39.0 (A) 01/10/2022    HDL 34.0 (A) 09/17/2020     No results found for: \"LDLCALC\"  Lab Results   Component Value Date    TRIG 208 (H) 04/14/2023    TRIG 384 (H) 01/10/2022    TRIG 216 (H) 09/17/2020       The 10-year ASCVD risk score (Kate CONRAD, et al., 2019) is: 6.4%    Values used to calculate the score:      Age: 49 years      Sex: Male      Is Non- : No      Diabetic: Yes      Tobacco smoker: No      Systolic Blood Pressure: 118 mmHg      Is BP treated: Yes      HDL Cholesterol: 35 mg/dL      Total Cholesterol: 138 mg/dL     DIABETES ASSESSMENT    Current Diabetes Medications:   Metformin ER 500mg - 2 tabs (1000mg) BID with meals  No side effects  Mounjaro 5mg weekly on Wed (11/6-)  No side effects. Has always had constipation his whole life  Has some appetite suppression  Glimepiride 4mg once daily at dinner     Historical Diabetes Medications:  Janumet (rashes)   Ozempic 0.5 mg weekly on Fri (was using samples interim of Mounjaro insurance coverage issues)     SMBG Measurements  Patient is using: glucometer " Johnathan   The patient is currently checking the blood glucose 3-4 times per day.  Patient does not report symptoms of hypoglycemia. Patient reports none.  Hypoglycemia frequency: none  Hypoglycemia awareness: No   Patient does not report symptoms of hyperglycemia. Patient previously reports blurry vision, excessive thirst, fatigue, and polyuria. He has not noticed symptoms with current regimen   FBG 2 hrs after lunch 2 hrs after dinner Note   13-Nov 128      12-Nov 139 151 149    11-Nov 151 161 159    10-Nov 150 171 157    9-Nov 137 165 169    8-Nov 133 153 159    7-Nov 149 121 126 started Mounjaro 5mg     SECONDARY PREVENTION  - Statin? Yes, atorvastatin 40mg daily  - ACE-I/ARB? Yes, lisinopril 10mg daily  - Aspirin? No  - Last eye exam:  - Last diabetic foot exam:    Baseline weight: 188 lbs (reports fluctuation between 186-190lbs)  Wt Readings from Last 5 Encounters:   11/01/24 85.5 kg (188 lb 9.6 oz)   05/31/24 83.2 kg (183 lb 8 oz)   05/26/24 84.4 kg (186 lb 1.1 oz)   03/04/24 90.7 kg (200 lb)   01/08/24 88.5 kg (195 lb 2 oz)     BMI Readings from Last 5 Encounters:   11/01/24 26.30 kg/m²   05/31/24 25.59 kg/m²   05/26/24 25.95 kg/m²   03/04/24 27.89 kg/m²   01/08/24 27.21 kg/m²     ADHERENCE/AFFORDABILITY  - High deductible. Does not qualify for  PAP/VAF. Insurance is not properly counting all of his out of pocket expenses towards deductible.  - In search of new insurance    Assessment/Plan   Problem List Items Addressed This Visit       Type 2 diabetes mellitus    Relevant Medications    tirzepatide (Mounjaro) 7.5 mg/0.5 mL pen injector    Other Relevant Orders    Referral to Clinical Pharmacy     Patient identified self-management goal:  BG control, weight loss, get off metformin  Patients diabetes was improved with Mounjaro, but cost issues with insurance made accessibility difficult.   Patient's goal A1c is < 7%.  Is pt at goal? Yes, 6.9% on 5/31/24    Patient's SMBGs are reportedly elevated recently.    Complications: Insurance claims seem to be inaccurate and have not corrected deductible, so copays are still high.   Rationale for plan: Patient's BG is within normal range with some elevation in FBG while on Mounjaro 5mg, metformin ER 1000mg BID, and glimepiride 4mg at dinner. Mounjaro seems to be more effective and tolerant than Ozempic. No noticeable changes in weight. Given current BG readings without concern for side effects or hypoglycemic episodes, will continue regimen and plan to increase Mounjaro in a few weeks.  Medication Changes:  CONTINUE  Metformin  mg - 2 tabs (1000 mg) twice daily with breakfast and dinner  Glimepiride 4mg with dinner  Mounjaro 5mg weekly on Thurs (11/6-11/27)  START Mounjaro 7.5mg (12/4-)  Compliance at present is estimated to be good. Efforts to improve compliance (if necessary) will be directed at   dietary modifications: try to still eat throughout the day to avoid snacking overnight, increase fiber to help with constipation  increased exercise: walking more (after dinner)  regular blood sugar monitoring: 3-4 times daily with tracking of BG every morning and 2 hours after meals.  Education Provided to Patient: MOA/side effects/dosing/administration for glimepiride and Mounjaro, BG monitoring.  Follow-up: 12/6 @ 9:30am Mounjaro tolerance and glimepiride taper based on BG control  PCP Follow-up: 12/2/24 Due for labs (CMP, lipids, A1c)    Notify your healthcare provider if you have any of the following:  -Diarrhea or vomiting for more than six hours  -Blood sugar >300 mg/dL more than once  -Low blood sugar (<70 mg/dL)  -Questions about your medications    Thank you,  Carrie Hebert, PharmD    Continue all meds under the continuation of care with the referring provider and clinical pharmacy team.  Verbal consent to manage patient's drug therapy was obtained. They were informed they may decline to participate or withdraw from participation in pharmacy services at any  time.

## 2024-11-13 NOTE — Clinical Note
No changes today but will plan to have patient start Mounjaro 7.5mg in a couple of weeks once he is done with 5mg. Thanks! Carrie Hebert, PharmD

## 2024-11-27 ENCOUNTER — APPOINTMENT (OUTPATIENT)
Dept: PHARMACY | Facility: HOSPITAL | Age: 49
End: 2024-11-27
Payer: COMMERCIAL

## 2024-11-29 ENCOUNTER — PHARMACY VISIT (OUTPATIENT)
Dept: PHARMACY | Facility: CLINIC | Age: 49
End: 2024-11-29
Payer: COMMERCIAL

## 2024-11-29 PROCEDURE — RXMED WILLOW AMBULATORY MEDICATION CHARGE

## 2024-11-30 PROCEDURE — RXMED WILLOW AMBULATORY MEDICATION CHARGE

## 2024-12-02 ENCOUNTER — LAB (OUTPATIENT)
Dept: LAB | Facility: LAB | Age: 49
End: 2024-12-02
Payer: COMMERCIAL

## 2024-12-02 ENCOUNTER — APPOINTMENT (OUTPATIENT)
Dept: PRIMARY CARE | Facility: CLINIC | Age: 49
End: 2024-12-02
Payer: COMMERCIAL

## 2024-12-02 VITALS
SYSTOLIC BLOOD PRESSURE: 109 MMHG | TEMPERATURE: 97.6 F | HEART RATE: 96 BPM | BODY MASS INDEX: 26.69 KG/M2 | OXYGEN SATURATION: 98 % | WEIGHT: 191.38 LBS | RESPIRATION RATE: 16 BRPM | DIASTOLIC BLOOD PRESSURE: 75 MMHG

## 2024-12-02 DIAGNOSIS — Z00.00 HEALTHCARE MAINTENANCE: ICD-10-CM

## 2024-12-02 DIAGNOSIS — E11.9 TYPE 2 DIABETES MELLITUS WITHOUT COMPLICATION, UNSPECIFIED WHETHER LONG TERM INSULIN USE (MULTI): ICD-10-CM

## 2024-12-02 DIAGNOSIS — E78.5 HYPERLIPIDEMIA, UNSPECIFIED HYPERLIPIDEMIA TYPE: ICD-10-CM

## 2024-12-02 DIAGNOSIS — F32.A DEPRESSION, UNSPECIFIED DEPRESSION TYPE: ICD-10-CM

## 2024-12-02 DIAGNOSIS — Z12.5 PROSTATE CANCER SCREENING: Primary | ICD-10-CM

## 2024-12-02 DIAGNOSIS — Z12.5 PROSTATE CANCER SCREENING: ICD-10-CM

## 2024-12-02 LAB
ALBUMIN SERPL BCP-MCNC: 4.7 G/DL (ref 3.4–5)
ALP SERPL-CCNC: 99 U/L (ref 33–120)
ALT SERPL W P-5'-P-CCNC: 35 U/L (ref 10–52)
ANION GAP SERPL CALC-SCNC: 12 MMOL/L (ref 10–20)
AST SERPL W P-5'-P-CCNC: 19 U/L (ref 9–39)
BILIRUB SERPL-MCNC: 1.6 MG/DL (ref 0–1.2)
BUN SERPL-MCNC: 10 MG/DL (ref 6–23)
CALCIUM SERPL-MCNC: 9.7 MG/DL (ref 8.6–10.3)
CHLORIDE SERPL-SCNC: 103 MMOL/L (ref 98–107)
CHOLEST SERPL-MCNC: 134 MG/DL (ref 0–199)
CHOLESTEROL/HDL RATIO: 3.5
CO2 SERPL-SCNC: 28 MMOL/L (ref 21–32)
CREAT SERPL-MCNC: 1.01 MG/DL (ref 0.5–1.3)
EGFRCR SERPLBLD CKD-EPI 2021: 90 ML/MIN/1.73M*2
ERYTHROCYTE [DISTWIDTH] IN BLOOD BY AUTOMATED COUNT: 13.5 % (ref 11.5–14.5)
EST. AVERAGE GLUCOSE BLD GHB EST-MCNC: 157 MG/DL
GLUCOSE SERPL-MCNC: 160 MG/DL (ref 74–99)
HBA1C MFR BLD: 7.1 %
HCT VFR BLD AUTO: 43.9 % (ref 41–52)
HDLC SERPL-MCNC: 37.8 MG/DL
HGB BLD-MCNC: 15.1 G/DL (ref 13.5–17.5)
LDLC SERPL CALC-MCNC: 61 MG/DL
MCH RBC QN AUTO: 29.2 PG (ref 26–34)
MCHC RBC AUTO-ENTMCNC: 34.4 G/DL (ref 32–36)
MCV RBC AUTO: 85 FL (ref 80–100)
NON HDL CHOLESTEROL: 96 MG/DL (ref 0–149)
NRBC BLD-RTO: 0 /100 WBCS (ref 0–0)
PLATELET # BLD AUTO: 170 X10*3/UL (ref 150–450)
POTASSIUM SERPL-SCNC: 4.6 MMOL/L (ref 3.5–5.3)
PROT SERPL-MCNC: 7.1 G/DL (ref 6.4–8.2)
PSA SERPL-MCNC: 0.76 NG/ML
RBC # BLD AUTO: 5.17 X10*6/UL (ref 4.5–5.9)
SODIUM SERPL-SCNC: 138 MMOL/L (ref 136–145)
TRIGL SERPL-MCNC: 175 MG/DL (ref 0–149)
VLDL: 35 MG/DL (ref 0–40)
WBC # BLD AUTO: 6.5 X10*3/UL (ref 4.4–11.3)

## 2024-12-02 PROCEDURE — 36415 COLL VENOUS BLD VENIPUNCTURE: CPT

## 2024-12-02 PROCEDURE — 99214 OFFICE O/P EST MOD 30 MIN: CPT | Performed by: STUDENT IN AN ORGANIZED HEALTH CARE EDUCATION/TRAINING PROGRAM

## 2024-12-02 PROCEDURE — 83036 HEMOGLOBIN GLYCOSYLATED A1C: CPT

## 2024-12-02 PROCEDURE — 3078F DIAST BP <80 MM HG: CPT | Performed by: STUDENT IN AN ORGANIZED HEALTH CARE EDUCATION/TRAINING PROGRAM

## 2024-12-02 PROCEDURE — 80061 LIPID PANEL: CPT

## 2024-12-02 PROCEDURE — 85027 COMPLETE CBC AUTOMATED: CPT

## 2024-12-02 PROCEDURE — G0103 PSA SCREENING: HCPCS

## 2024-12-02 PROCEDURE — 3074F SYST BP LT 130 MM HG: CPT | Performed by: STUDENT IN AN ORGANIZED HEALTH CARE EDUCATION/TRAINING PROGRAM

## 2024-12-02 PROCEDURE — 80053 COMPREHEN METABOLIC PANEL: CPT

## 2024-12-02 PROCEDURE — 4010F ACE/ARB THERAPY RXD/TAKEN: CPT | Performed by: STUDENT IN AN ORGANIZED HEALTH CARE EDUCATION/TRAINING PROGRAM

## 2024-12-02 ASSESSMENT — ENCOUNTER SYMPTOMS
WEIGHT LOSS: 0
FATIGUE: 0
NAUSEA: 0
BLURRED VISION: 0
DIZZINESS: 0
LIGHT-HEADEDNESS: 0
SHORTNESS OF BREATH: 0
VOMITING: 0
POLYPHAGIA: 0
FEVER: 0
POLYDIPSIA: 0

## 2024-12-02 NOTE — PROGRESS NOTES
Subjective   Danial Navarro Jr. is a 49 y.o. male who presents for Diabetes (Pt here today for 6 month F/U for A1C).  Diabetes  He presents for his follow-up diabetic visit. He has type 2 diabetes mellitus. His disease course has been worsening. There are no hypoglycemic associated symptoms. Pertinent negatives for hypoglycemia include no dizziness. Pertinent negatives for diabetes include no blurred vision, no chest pain, no fatigue, no foot paresthesias, no polydipsia, no polyphagia and no weight loss. There are no hypoglycemic complications. There are no diabetic complications. Risk factors for coronary artery disease include diabetes mellitus and male sex. Current diabetic treatment includes diet and oral agent (dual therapy). He is compliant with treatment all of the time. His weight is stable. He is following a diabetic and generally healthy diet. When asked about meal planning, he reported none. He participates in exercise daily. An ACE inhibitor/angiotensin II receptor blocker is being taken. He does not see a podiatrist.Eye exam is current.   Med Refill  Pertinent negatives include no chest pain, fatigue, fever, nausea or vomiting.     Patient is following with psychiatry, patient is doing well with Vraylar and Viibryd, like to continue on these.    Patient is following with our clinical pharmacist as well, doing well with metformin, Curt.  States blood sugars are stable when checking at home.    He is walking regularly, improving regular exercise.    States he still does cheat on his diet but overall is doing well with this.    Review of Systems   Constitutional:  Negative for fatigue, fever and weight loss.   Eyes:  Negative for blurred vision.   Respiratory:  Negative for shortness of breath.    Cardiovascular:  Negative for chest pain.   Gastrointestinal:  Negative for nausea and vomiting.   Endocrine: Negative for polydipsia and polyphagia.   Neurological:  Negative for dizziness and light-headedness.    All other systems reviewed and are negative.      Objective   Physical Exam  Vitals reviewed.   Constitutional:       General: He is not in acute distress.     Appearance: Normal appearance. He is not toxic-appearing.   HENT:      Head: Normocephalic and atraumatic.      Nose: Nose normal.   Eyes:      Extraocular Movements: Extraocular movements intact.   Cardiovascular:      Rate and Rhythm: Normal rate and regular rhythm.      Heart sounds: No murmur heard.     No friction rub. No gallop.   Pulmonary:      Effort: Pulmonary effort is normal. No respiratory distress.      Breath sounds: Normal breath sounds. No wheezing, rhonchi or rales.   Skin:     General: Skin is warm and dry.   Neurological:      General: No focal deficit present.      Mental Status: He is alert.   Psychiatric:         Mood and Affect: Mood normal.         Behavior: Behavior normal.         Assessment/Plan   Problem List Items Addressed This Visit       Depression    HLD (hyperlipidemia)    Relevant Orders    Comprehensive Metabolic Panel    CBC    Lipid Panel    Type 2 diabetes mellitus    Relevant Orders    Comprehensive Metabolic Panel    CBC    Lipid Panel    Hemoglobin A1C     Other Visit Diagnoses       Prostate cancer screening    -  Primary    Relevant Orders    Prostate Specific Antigen    Healthcare maintenance        Relevant Orders    Comprehensive Metabolic Panel    CBC    Lipid Panel    Prostate Specific Antigen    Hemoglobin A1C            Lab Results   Component Value Date    HGBA1C 6.9 (A) 05/31/2024     Continue treatment including: Metformin, Sulfonylurea, and GLP-1/terzepatide  Complications include: none  Continue to monitor blood sugars  Continue with follow ups including:   Ophthalmology: Up-to-date  Podiatry: Not Indicated  Cardiology: Not Indicated  Discussed healthy, low carb diet and trying to get 150 minutes of physical activity per week.  Follow up in 3 months or sooner if new concerns arise   We will continue  his medications as above.        Continue follow-up with psychiatry continue on current psychiatric regimen.    Follow-up as new concerns arise

## 2024-12-06 ENCOUNTER — APPOINTMENT (OUTPATIENT)
Dept: PHARMACY | Facility: HOSPITAL | Age: 49
End: 2024-12-06
Payer: COMMERCIAL

## 2024-12-06 DIAGNOSIS — E11.9 TYPE 2 DIABETES MELLITUS WITHOUT COMPLICATION, WITHOUT LONG-TERM CURRENT USE OF INSULIN (MULTI): ICD-10-CM

## 2024-12-06 NOTE — PROGRESS NOTES
"Pharmacist Clinic: Diabetes Management  Danial Navarro Jr. is a 49 y.o. male who presents for a follow-up evaluation of his Type 2 diabetes mellitus.   Referring Provider: DO HUY Cameron    Allergies   Allergen Reactions    Penicillins Unknown     Almost 50 years ago       LAB REVIEW   Glucose (mg/dL)   Date Value   12/02/2024 160 (H)   04/14/2023 222 (H)   01/10/2022 155 (H)   09/17/2020 219 (H)     POC HEMOGLOBIN A1c (%)   Date Value   05/31/2024 6.9 (A)   03/04/2024 9.8 (A)   11/02/2023 9.3 (A)     Hemoglobin A1C (%)   Date Value   12/02/2024 7.1 (H)     Urea Nitrogen (mg/dL)   Date Value   12/02/2024 10   04/14/2023 12   01/10/2022 13   09/17/2020 15     Creatinine (mg/dL)   Date Value   12/02/2024 1.01   04/14/2023 0.95   01/10/2022 1.00   09/17/2020 0.92     No results found for: \"ALBUR\", \"[id number]\"  Lab Results   Component Value Date    CHOL 134 12/02/2024    CHOL 138 04/14/2023    CHOL 219 (H) 01/10/2022     Lab Results   Component Value Date    HDL 37.8 12/02/2024    HDL 35.0 (A) 04/14/2023    HDL 39.0 (A) 01/10/2022     Lab Results   Component Value Date    LDLCALC 61 12/02/2024     Lab Results   Component Value Date    TRIG 175 (H) 12/02/2024    TRIG 208 (H) 04/14/2023    TRIG 384 (H) 01/10/2022       The 10-year ASCVD risk score (Kate CONRAD, et al., 2019) is: 5%    Values used to calculate the score:      Age: 49 years      Sex: Male      Is Non- : No      Diabetic: Yes      Tobacco smoker: No      Systolic Blood Pressure: 109 mmHg      Is BP treated: Yes      HDL Cholesterol: 37.8 mg/dL      Total Cholesterol: 134 mg/dL     DIABETES ASSESSMENT    Current Diabetes Medications:   Metformin ER 500mg - 2 tabs (1000mg) BID with meals  No side effects  Mounjaro 7.5mg weekly on Wed (12/4-)  No side effects. Has always had constipation his whole life  Has some appetite suppression  Glimepiride 4mg once daily at dinner     Historical Diabetes Medications:  Janumet (rashes) "   Ozempic 0.5 mg weekly on Fri (was using samples interim of Mounjaro insurance coverage issues)     SMBG Measurements  Patient is using: glucometer [employer]   The patient is currently checking the blood glucose 3-4 times per day.  Patient does not report symptoms of hypoglycemia. Patient reports none.  Hypoglycemia frequency: none  Hypoglycemia awareness: No   Patient does not report symptoms of hyperglycemia. Patient previously reports blurry vision, excessive thirst, fatigue, and polyuria. He has not noticed symptoms with current regimen    Before Mounjaro 7.5mg, SMB-160s. Patient has been feeling sick (cold/flu) and not able to eat as much for the past week or so   FBG 2 hrs after lunch 2 hrs after dinner Note   6-Dec 160      -Dec 117 129 141    -Dec 122 139 134 started Mounjaro 7.5mg       SECONDARY PREVENTION  - Statin? Yes, atorvastatin 40mg daily  - ACE-I/ARB? Yes, lisinopril 10mg daily  - Aspirin? No  - Last eye exam:  - Last diabetic foot exam:    Baseline weight: 188 lbs (reports fluctuation between 186-190lbs)  Wt Readings from Last 5 Encounters:   24 86.8 kg (191 lb 6 oz)   24 85.5 kg (188 lb 9.6 oz)   24 83.2 kg (183 lb 8 oz)   24 84.4 kg (186 lb 1.1 oz)   24 90.7 kg (200 lb)     BMI Readings from Last 5 Encounters:   24 26.69 kg/m²   24 26.30 kg/m²   24 25.59 kg/m²   24 25.95 kg/m²   24 27.89 kg/m²     ADHERENCE/AFFORDABILITY  - High deductible. Does not qualify for  PAP/VAF. Insurance is not properly counting all of his out of pocket expenses towards deductible.  - In search of new insurance    Assessment/Plan   Problem List Items Addressed This Visit       Type 2 diabetes mellitus    Relevant Orders    Referral to Clinical Pharmacy   Patient identified self-management goal:  BG control, weight loss, get off metformin  Patients diabetes was improved with Mounjaro, but cost issues with insurance made accessibility difficult.    Patient's goal A1c is < 7%.  Is pt at goal? Yes, 6.9% on 5/31/24    Patient's SMBGs are reportedly elevated recently.   Complications: Insurance claims seem to be inaccurate and have not corrected deductible, so copays are still high.   Rationale for plan: Patient's FBG have been elevated but PPBG seems to be within range. Patient's FBG may have improved with metformin ER 1000mg BID, and glimepiride 4mg at dinner and increase in Mounjaro except for this morning's reading. Mounjaro seems to be more effective and tolerant than Ozempic. No noticeable changes in weight yet but patient admits that he is not always compliant with healthy diet. Given current BG readings without concern for side effects or hypoglycemic episodes, will continue regimen and plan to increase Mounjaro in a few weeks.  Medication Changes:  CONTINUE  Metformin  mg - 2 tabs (1000 mg) twice daily with breakfast and dinner  Glimepiride 4mg with dinner  Mounjaro 7.5mg weekly on Thurs (12/4-12/25)  Compliance at present is estimated to be good. Efforts to improve compliance (if necessary) will be directed at   dietary modifications: try to still eat throughout the day to avoid snacking overnight, increase fiber to help with constipation  increased exercise: walking more (after dinner)  regular blood sugar monitoring: 3-4 times daily with tracking of BG every morning and 2 hours after meals.  Education Provided to Patient: BG monitoring and patient is to call me if he experiences multiple hypoglycemic episodes.  Follow-up: 12/20 @ 9:30am Mounjaro tolerance and glimepiride taper based on BG control  PCP Follow-up: 12/2/24 Due for labs (CMP, lipids, A1c)    Notify your healthcare provider if you have any of the following:  -Diarrhea or vomiting for more than six hours  -Blood sugar >300 mg/dL more than once  -Low blood sugar (<70 mg/dL)  -Questions about your medications    Thank you,  Carrie Hebert, PharmD    Continue all meds under the  continuation of care with the referring provider and clinical pharmacy team.  Verbal consent to manage patient's drug therapy was obtained. They were informed they may decline to participate or withdraw from participation in pharmacy services at any time.

## 2024-12-06 NOTE — Clinical Note
No changes today but his FBG may be improving with the increase in Mounjaro. Will plan to titrate Mounjaro and adjust meds as needed at follow-up. Thanks! Carrie Hebert, PharmD

## 2024-12-09 ENCOUNTER — PHARMACY VISIT (OUTPATIENT)
Dept: PHARMACY | Facility: CLINIC | Age: 49
End: 2024-12-09
Payer: COMMERCIAL

## 2024-12-09 DIAGNOSIS — E11.9 TYPE 2 DIABETES MELLITUS WITHOUT COMPLICATION, WITHOUT LONG-TERM CURRENT USE OF INSULIN (MULTI): ICD-10-CM

## 2024-12-09 DIAGNOSIS — M25.859 HIP IMPINGEMENT SYNDROME, UNSPECIFIED LATERALITY: ICD-10-CM

## 2024-12-09 RX ORDER — MELOXICAM 7.5 MG/1
7.5 TABLET ORAL DAILY
Qty: 90 TABLET | Refills: 3 | Status: SHIPPED | OUTPATIENT
Start: 2024-12-09 | End: 2025-12-09

## 2024-12-09 RX ORDER — LISINOPRIL 10 MG/1
10 TABLET ORAL DAILY
Qty: 90 TABLET | Refills: 3 | Status: SHIPPED | OUTPATIENT
Start: 2024-12-09

## 2024-12-20 ENCOUNTER — APPOINTMENT (OUTPATIENT)
Dept: PHARMACY | Facility: HOSPITAL | Age: 49
End: 2024-12-20
Payer: COMMERCIAL

## 2024-12-20 DIAGNOSIS — E11.9 TYPE 2 DIABETES MELLITUS WITHOUT COMPLICATION, WITHOUT LONG-TERM CURRENT USE OF INSULIN (MULTI): Primary | ICD-10-CM

## 2024-12-20 RX ORDER — TIRZEPATIDE 10 MG/.5ML
10 INJECTION, SOLUTION SUBCUTANEOUS WEEKLY
Qty: 6 ML | Refills: 1 | Status: SHIPPED | OUTPATIENT
Start: 2024-12-20

## 2024-12-20 NOTE — PROGRESS NOTES
"Pharmacist Clinic: Diabetes Management  Danial Navarro Jr. is a 49 y.o. male who presents for a follow-up evaluation of his Type 2 diabetes mellitus.   Referring Provider: DO HUY Cameron    Allergies   Allergen Reactions    Penicillins Unknown     Almost 50 years ago       LAB REVIEW   Glucose (mg/dL)   Date Value   12/02/2024 160 (H)   04/14/2023 222 (H)   01/10/2022 155 (H)   09/17/2020 219 (H)     POC HEMOGLOBIN A1c (%)   Date Value   05/31/2024 6.9 (A)   03/04/2024 9.8 (A)   11/02/2023 9.3 (A)     Hemoglobin A1C (%)   Date Value   12/02/2024 7.1 (H)     Urea Nitrogen (mg/dL)   Date Value   12/02/2024 10   04/14/2023 12   01/10/2022 13   09/17/2020 15     Creatinine (mg/dL)   Date Value   12/02/2024 1.01   04/14/2023 0.95   01/10/2022 1.00   09/17/2020 0.92     No results found for: \"ALBUR\", \"[id number]\"  Lab Results   Component Value Date    CHOL 134 12/02/2024    CHOL 138 04/14/2023    CHOL 219 (H) 01/10/2022     Lab Results   Component Value Date    HDL 37.8 12/02/2024    HDL 35.0 (A) 04/14/2023    HDL 39.0 (A) 01/10/2022     Lab Results   Component Value Date    LDLCALC 61 12/02/2024     Lab Results   Component Value Date    TRIG 175 (H) 12/02/2024    TRIG 208 (H) 04/14/2023    TRIG 384 (H) 01/10/2022       The 10-year ASCVD risk score (Kate CONRAD, et al., 2019) is: 5%    Values used to calculate the score:      Age: 49 years      Sex: Male      Is Non- : No      Diabetic: Yes      Tobacco smoker: No      Systolic Blood Pressure: 109 mmHg      Is BP treated: Yes      HDL Cholesterol: 37.8 mg/dL      Total Cholesterol: 134 mg/dL     DIABETES ASSESSMENT    Current Diabetes Medications:   Metformin ER 500mg - 2 tabs (1000mg) BID with meals  No side effects  Mounjaro 7.5mg weekly on Wed (12/4-12/25)  No side effects. Has always had constipation his whole life  Has some appetite suppression  Glimepiride 4mg once daily at dinner     Historical Diabetes Medications:  Janumet (rashes) "   Ozempic 0.5 mg weekly on Fri (was using samples interim of Mounjaro insurance coverage issues)     SMBG Measurements  Patient is using: glucometer [employer]   The patient is currently checking the blood glucose 3-4 times per day.  Patient does not report symptoms of hypoglycemia. Patient reports none.  Hypoglycemia frequency: none  Hypoglycemia awareness: No   Patient does not report symptoms of hyperglycemia. Patient previously reports blurry vision, excessive thirst, fatigue, and polyuria. He has not noticed symptoms with current regimen    Before Mounjaro 7.5mg, SMB-160s. Patient has been feeling sick (cold/flu) and not able to eat as much for the past week or so   FBG 2 hrs after lunch 2 hrs after dinner Note   6-Dec 160      -Dec 117 129 141    -Dec 122 139 134 started Mounjaro 7.5mg       SECONDARY PREVENTION  - Statin? Yes, atorvastatin 40mg daily  - ACE-I/ARB? Yes, lisinopril 10mg daily  - Aspirin? No  - Last eye exam:  - Last diabetic foot exam:    Baseline weight: 188 lbs (reports fluctuation between 186-190lbs)  Wt Readings from Last 5 Encounters:   24 86.8 kg (191 lb 6 oz)   24 85.5 kg (188 lb 9.6 oz)   24 83.2 kg (183 lb 8 oz)   24 84.4 kg (186 lb 1.1 oz)   24 90.7 kg (200 lb)     BMI Readings from Last 5 Encounters:   24 26.69 kg/m²   24 26.30 kg/m²   24 25.59 kg/m²   24 25.95 kg/m²   24 27.89 kg/m²     ADHERENCE/AFFORDABILITY  - High deductible. Does not qualify for  PAP/VAF. Insurance is not properly counting all of his out of pocket expenses towards deductible.  - In search of new insurance    Assessment/Plan   Problem List Items Addressed This Visit       Type 2 diabetes mellitus - Primary    Relevant Medications    tirzepatide (Mounjaro) 10 mg/0.5 mL pen injector    Other Relevant Orders    Referral to Clinical Pharmacy     Patient identified self-management goal:  BG control, weight loss, get off metformin  Patients diabetes  was improved with Mounjaro, but cost issues with insurance made accessibility difficult.   Patient's goal A1c is < 7%.  Is pt at goal? No, 7.1% on 12/2/24 but very close to goal    Patient's SMBGs are reportedly elevated recently.   Complications: Insurance claims seem to be inaccurate and have not corrected deductible, so copays are still high.   Rationale for plan: Patient's FBG have been elevated but PPBG seems to be within range. Patient's FBG may have improved with metformin ER 1000mg BID, and glimepiride 4mg at dinner and increase in Mounjaro except for this morning's reading. Mounjaro seems to be more effective and tolerant than Ozempic. No noticeable changes in weight yet but patient admits that he is not always compliant with healthy diet. Given current BG readings without concern for side effects or hypoglycemic episodes, will continue regimen and plan to increase Mounjaro in a few weeks.  Medication Changes:  CONTINUE  Metformin  mg - 2 tabs (1000 mg) twice daily with breakfast and dinner  START:  Mounjaro 10mg weekly on Wed after completing 7.5mg (1/1-)  3 month supply given due to cost and insurance change  DECREASE:  Glimepiride 2mg with dinner  Compliance at present is estimated to be good. Efforts to improve compliance (if necessary) will be directed at   dietary modifications: try to still eat throughout the day to avoid snacking overnight, increase fiber to help with constipation  increased exercise: walking more (after dinner)  regular blood sugar monitoring: 3-4 times daily with tracking of BG every morning and 2 hours after meals.  Education Provided to Patient: BG monitoring and patient is to call me if he experiences multiple hypoglycemic episodes.  Follow-up: 1/27 @ 9:30am Mounjaro tolerance and glimepiride taper based on BG control  PCP Follow-up: none at this time    Notify your healthcare provider if you have any of the following:  -Diarrhea or vomiting for more than six  hours  -Blood sugar >300 mg/dL more than once  -Low blood sugar (<70 mg/dL)  -Questions about your medications    Thank you,  Carrie Hebert, PharmD    Continue all meds under the continuation of care with the referring provider and clinical pharmacy team.  Verbal consent to manage patient's drug therapy was obtained. They were informed they may decline to participate or withdraw from participation in pharmacy services at any time.

## 2024-12-20 NOTE — Clinical Note
Mounjaro was increased and will be continued for 3 months due to med cost. Otherwise, patient is doing well and glimepiride will be decreased with Mounjaro titration. Will continue to monitor. Thanks! Carrie Hebert, PharmD

## 2025-01-01 PROCEDURE — RXMED WILLOW AMBULATORY MEDICATION CHARGE

## 2025-01-07 ENCOUNTER — PHARMACY VISIT (OUTPATIENT)
Dept: PHARMACY | Facility: CLINIC | Age: 50
End: 2025-01-07
Payer: COMMERCIAL

## 2025-01-08 RX ORDER — GLIMEPIRIDE 4 MG/1
2 TABLET ORAL
Start: 2025-01-08

## 2025-01-27 ENCOUNTER — APPOINTMENT (OUTPATIENT)
Dept: PHARMACY | Facility: HOSPITAL | Age: 50
End: 2025-01-27
Payer: COMMERCIAL

## 2025-01-27 VITALS — BODY MASS INDEX: 24.83 KG/M2 | WEIGHT: 178 LBS

## 2025-01-27 DIAGNOSIS — E11.9 TYPE 2 DIABETES MELLITUS WITHOUT COMPLICATION, WITHOUT LONG-TERM CURRENT USE OF INSULIN (MULTI): ICD-10-CM

## 2025-01-27 NOTE — Clinical Note
Patient's diabetes is currently well controlled and Mounjaro is well tolerated. Will lower metformin given his current BG readings. Thanks! Carrie Hebert, PharmD'

## 2025-01-27 NOTE — PROGRESS NOTES
"Pharmacist Clinic: Diabetes Management  Danial Navarro Jr. is a 49 y.o. male who presents for a follow-up evaluation of his Type 2 diabetes mellitus.   Referring Provider: DO HUY Cameron    Allergies   Allergen Reactions    Penicillins Unknown     Almost 50 years ago       LAB REVIEW   Glucose (mg/dL)   Date Value   12/02/2024 160 (H)   04/14/2023 222 (H)   01/10/2022 155 (H)   09/17/2020 219 (H)     POC HEMOGLOBIN A1c (%)   Date Value   05/31/2024 6.9 (A)   03/04/2024 9.8 (A)   11/02/2023 9.3 (A)     Hemoglobin A1C (%)   Date Value   12/02/2024 7.1 (H)     Urea Nitrogen (mg/dL)   Date Value   12/02/2024 10   04/14/2023 12   01/10/2022 13   09/17/2020 15     Creatinine (mg/dL)   Date Value   12/02/2024 1.01   04/14/2023 0.95   01/10/2022 1.00   09/17/2020 0.92     No results found for: \"ALBUR\", \"AQG85CIA\"  Lab Results   Component Value Date    CHOL 134 12/02/2024    CHOL 138 04/14/2023    CHOL 219 (H) 01/10/2022     Lab Results   Component Value Date    HDL 37.8 12/02/2024    HDL 35.0 (A) 04/14/2023    HDL 39.0 (A) 01/10/2022     Lab Results   Component Value Date    LDLCALC 61 12/02/2024     Lab Results   Component Value Date    TRIG 175 (H) 12/02/2024    TRIG 208 (H) 04/14/2023    TRIG 384 (H) 01/10/2022     DIABETES ASSESSMENT    Current Diabetes Medications:   Metformin ER 500mg - 2 tabs (1000mg) BID with meals  No side effects  Mounjaro 10mg weekly on Wed (1/11-, has 3 month supply)  No side effects. Has always had constipation his whole life, but it's not as bad at the moment  Has appetite suppression  Glimepiride 2mg once daily at dinner (then stopped as discussed when PPBG is low 2 weeks prior to follow-up)     Historical Diabetes Medications:  Janumet (rashes)   Ozempic 0.5 mg weekly on Fri (switched to Mounjaro which is more tolerable and effective)     SMBG Measurements  [facility]   The patient is currently checking the blood glucose 3-4 times per day.  Patient does " not report symptoms of hypoglycemia. Patient reports none.  Hypoglycemia frequency: none  Hypoglycemia awareness: No   Patient does not report symptoms of hyperglycemia. Patient previously reports blurry vision, excessive thirst, fatigue, and polyuria. He has not noticed symptoms with current regimen  Date FBG 2 hrs after lunch 2 hrs after dinner Note   6-Jan 115 127 118    7-Jan 106 129 123    8-Jan 116 129 99    9-Jan 114 111 109    10-Kobi 113 117 94    11-Jan 92 118 108    12-Jan 99 129 117    13-Jan 88 99 111    14-Jan 100 123 128    15-Kobi 92 119 98    16-Jan 94 88 75    17-Jan 87 79 85 stopped glimepiride as discussed with low PPBG   18-Jan 120 97 90    19-Jan 121 125 92    20-Jan 107 116 105    21-Jan 100 104 104    22-Jan 96 113 107    23-Jan 113 106 141 had Mexican for dinner   24-Jan 110 114 102    25-Jan 100 119 106    26-Jan 102 120 109    27-Jan 112        Diet: Not as hungry but still eating. Not drinking as much soda    PRIMARY PREVENTION  - Statin? Yes, atorvastatin 40mg daily  - ACE-I/ARB? Yes, lisinopril 10mg daily  - Aspirin? No  - Last eye exam:  - Last diabetic foot exam:    The 10-year ASCVD risk score (Kate CONRAD, et al., 2019) is: 5%    Values used to calculate the score:      Age: 49 years      Sex: Male      Is Non- : No      Diabetic: Yes      Tobacco smoker: No      Systolic Blood Pressure: 109 mmHg      Is BP treated: Yes      HDL Cholesterol: 37.8 mg/dL      Total Cholesterol: 134 mg/dL    Baseline weight: 188 lbs (reports fluctuation between 186-190lbs)  Wt Readings from Last 5 Encounters:   01/27/25 80.7 kg (178 lb)   12/02/24 86.8 kg (191 lb 6 oz)   11/01/24 85.5 kg (188 lb 9.6 oz)   05/31/24 83.2 kg (183 lb 8 oz)   05/26/24 84.4 kg (186 lb 1.1 oz)     BMI Readings from Last 5 Encounters:   01/27/25 24.83 kg/m²   12/02/24 26.69 kg/m²   11/01/24 26.30 kg/m²   05/31/24 25.59 kg/m²   05/26/24 25.95 kg/m²     ADHERENCE/AFFORDABILITY  - High deductible. Does not  qualify for  PAP/VAF. Insurance is not properly counting all of his out of pocket expenses towards deductible.  - In search of new insurance    Assessment/Plan   Problem List Items Addressed This Visit       Type 2 diabetes mellitus    Relevant Orders    Referral to Clinical Pharmacy   Patient identified self-management goal:  BG control, weight loss, get off metformin  Patients diabetes was improved with Mounjaro, but cost issues with insurance made accessibility difficult.   Patient's goal A1c is < 7%.  Is pt at goal? No, 7.1% on 12/2/24 but very close to goal    Patient's SMBGs are reportedly elevated recently.   Rationale for plan: Patient's FBG have been elevated but PPBG seems to be within range. Patient's FBG have improved with metformin ER 1000mg BID and increase in Mounjaro. Had low PPBG after dinner with glimepiride 4mg. Noticeable weight loss. Given current BG readings with some concern for lower PPBG, will decrease metformin regimen and continue Mounjaro. May increase Mounjaro to 12.5mg depending on weight loss and BG control (patient has 2 boxes of 2.5mg).  Medication Changes:  CONTINUE  Mounjaro 10mg weekly on Wed (1/1-2/19)  DECREASE:  Metformin  mg - 2 tabs (1000 mg) with breakfast and 1 tab (500mg) with dinner  Compliance at present is estimated to be good. Efforts to improve compliance (if necessary) will be directed at   dietary modifications: try to still eat throughout the day to avoid snacking overnight, increase fiber to help with constipation  increased exercise: walking more (after dinner)  regular blood sugar monitoring: 3-4 times daily with tracking of BG every morning and 2 hours after meals.  Education Provided to Patient: BG monitoring and patient is to call me if he experiences multiple hypoglycemic episodes.  Pharmacy Follow-up: 2/25 @ 9:30am Mounjaro titration and metformin taper based on BG control  PCP Follow-up: none at this time - 6 months    Notify your healthcare  provider if you have any of the following:  -Diarrhea or vomiting for more than six hours  -Blood sugar >300 mg/dL more than once  -Low blood sugar (<70 mg/dL)  -Questions about your medications    Thank you,  Carrie Hebert, PharmD    Continue all meds under the continuation of care with the referring provider and clinical pharmacy team.  Verbal consent to manage patient's drug therapy was obtained. They were informed they may decline to participate or withdraw from participation in pharmacy services at any time.

## 2025-01-31 PROCEDURE — RXMED WILLOW AMBULATORY MEDICATION CHARGE

## 2025-02-12 ENCOUNTER — PHARMACY VISIT (OUTPATIENT)
Dept: PHARMACY | Facility: CLINIC | Age: 50
End: 2025-02-12
Payer: COMMERCIAL

## 2025-02-25 ENCOUNTER — APPOINTMENT (OUTPATIENT)
Dept: PHARMACY | Facility: HOSPITAL | Age: 50
End: 2025-02-25
Payer: COMMERCIAL

## 2025-02-25 VITALS — BODY MASS INDEX: 23.85 KG/M2 | WEIGHT: 171 LBS

## 2025-02-25 DIAGNOSIS — E11.9 TYPE 2 DIABETES MELLITUS WITHOUT COMPLICATION, WITHOUT LONG-TERM CURRENT USE OF INSULIN (MULTI): Primary | ICD-10-CM

## 2025-02-25 PROCEDURE — RXMED WILLOW AMBULATORY MEDICATION CHARGE

## 2025-02-25 RX ORDER — TIRZEPATIDE 12.5 MG/.5ML
12.5 INJECTION, SOLUTION SUBCUTANEOUS WEEKLY
Qty: 2 ML | Refills: 1 | Status: SHIPPED | OUTPATIENT
Start: 2025-02-25

## 2025-02-25 NOTE — Clinical Note
Improved and stable BG readings. Will increase Mounjaro and decrease metformin and switch to dinner dosing. Thanks! Carrie Hebert, PharmD

## 2025-02-25 NOTE — PROGRESS NOTES
"Pharmacist Clinic: Diabetes Management  Danial Navarro Jr. is a 49 y.o. male who presents for a follow-up evaluation of his Type 2 diabetes mellitus.   Referring Provider: DO HUY Cameron    Allergies   Allergen Reactions    Penicillins Unknown     Almost 50 years ago       LAB REVIEW   Glucose (mg/dL)   Date Value   12/02/2024 160 (H)   04/14/2023 222 (H)   01/10/2022 155 (H)   09/17/2020 219 (H)     POC HEMOGLOBIN A1c (%)   Date Value   05/31/2024 6.9 (A)   03/04/2024 9.8 (A)   11/02/2023 9.3 (A)     Hemoglobin A1C (%)   Date Value   12/02/2024 7.1 (H)     Urea Nitrogen (mg/dL)   Date Value   12/02/2024 10   04/14/2023 12   01/10/2022 13   09/17/2020 15     Creatinine (mg/dL)   Date Value   12/02/2024 1.01   04/14/2023 0.95   01/10/2022 1.00   09/17/2020 0.92     No results found for: \"ALBUR\", \"[id number]\"  Lab Results   Component Value Date    CHOL 134 12/02/2024    CHOL 138 04/14/2023    CHOL 219 (H) 01/10/2022     Lab Results   Component Value Date    HDL 37.8 12/02/2024    HDL 35.0 (A) 04/14/2023    HDL 39.0 (A) 01/10/2022     Lab Results   Component Value Date    LDLCALC 61 12/02/2024     Lab Results   Component Value Date    TRIG 175 (H) 12/02/2024    TRIG 208 (H) 04/14/2023    TRIG 384 (H) 01/10/2022     DIABETES ASSESSMENT    Current Diabetes Medications:   Metformin ER 500mg - 2 tabs (1000mg) QAM and 1 tab (500mg) QPM  No side effects  Mounjaro 10mg weekly on Wed (1/11-, has 3 month supply)  No side effects. Has always had constipation his whole life, but it's not as bad at the moment  Has appetite suppression     Historical Diabetes Medications:  Janumet (rashes)   Ozempic 0.5 mg weekly on Fri (switched to Mounjaro which is more tolerable and effective)  Glimepiride 2mg once daily at dinner (therapy completed as BG became more controlled, started having hypoglycemia)     SMBG Measurements  Patient is using: glucometer [facility]   The patient is currently checking the blood glucose 3-4 times per " day.  Patient does not report symptoms of hypoglycemia. Patient reports none.  Hypoglycemia frequency: none  Hypoglycemia awareness: No   Patient does not report symptoms of hyperglycemia. Patient previously reports blurry vision, excessive thirst, fatigue, and polyuria. He has not noticed symptoms with current regimen  Date FBG 2 hrs after lunch 2 hrs after dinner Note   15-Feb 97 115 121    16-Feb 120 117 124    17-Feb 114 106 119    18-Feb 123 117 110    19-Feb 119 113 120    20-Feb 123 99 109    21-Feb 122 93 109    22-Feb 127 106 113    23-Feb 112 102 88    24-Feb 103 90 112    25-Feb 122        Diet: Not as hungry but still eating. Not drinking as much soda    PRIMARY PREVENTION  - Statin? Yes, atorvastatin 40mg daily  - ACE-I/ARB? Yes, lisinopril 10mg daily  - Aspirin? No  - Last eye exam:  - Last diabetic foot exam:    The 10-year ASCVD risk score (Kate CONRAD, et al., 2019) is: 5%    Values used to calculate the score:      Age: 49 years      Sex: Male      Is Non- : No      Diabetic: Yes      Tobacco smoker: No      Systolic Blood Pressure: 109 mmHg      Is BP treated: Yes      HDL Cholesterol: 37.8 mg/dL      Total Cholesterol: 134 mg/dL    Baseline weight: 188 lbs (reports fluctuation between 186-190lbs)  Wt Readings from Last 5 Encounters:   02/25/25 77.6 kg (171 lb)   01/27/25 80.7 kg (178 lb)   12/02/24 86.8 kg (191 lb 6 oz)   11/01/24 85.5 kg (188 lb 9.6 oz)   05/31/24 83.2 kg (183 lb 8 oz)     BMI Readings from Last 5 Encounters:   02/25/25 23.85 kg/m²   01/27/25 24.83 kg/m²   12/02/24 26.69 kg/m²   11/01/24 26.30 kg/m²   05/31/24 25.59 kg/m²     ADHERENCE/AFFORDABILITY  - High deductible. Does not qualify for  PAP/VAF. Insurance is not properly counting all of his out of pocket expenses towards deductible.    Assessment/Plan   Problem List Items Addressed This Visit       Type 2 diabetes mellitus - Primary    Relevant Medications    tirzepatide (Mounjaro) 12.5 mg/0.5 mL pen  injector    Other Relevant Orders    Referral to Clinical Pharmacy     Patient identified self-management goal:  BG control, weight loss, get off metformin  Patients diabetes was improved with Mounjaro  Patient's goal A1c is < 7%.  Is pt at goal? No, 7.1% on 12/2/24 but very close to goal    Patient's SMBGs are reportedly stable recently and slightly elevated FBG.   Rationale for plan: Patient's FBG have been slightly elevated but PPBG are within range and slightly lower. Patient's FBG have improved with current metformin ER and in Mounjaro regimen. Noticeable weight loss. Given current BG readings with some concern for lower PPBG, will decrease metformin regimen and increase Mounjaro.   Medication Changes:  INCREASE:  Mounjaro 12.5mg weekly on Wed (2/26)  DECREASE:  Metformin  mg - 2 tabs (1000 mg) with dinner  Can decrease to 1 tab QPM if BG is consistently low  Compliance at present is estimated to be good. Efforts to improve compliance (if necessary) will be directed at   dietary modifications: try to still eat throughout the day to avoid snacking overnight, increase fiber to help with constipation  increased exercise: walking more (after dinner)  regular blood sugar monitoring: 3-4 times daily with tracking of BG every morning and 2 hours after meals.  Education Provided to Patient: BG monitoring and patient is to call me if he experiences multiple hypoglycemic episodes. Take Mounjaro 10mg + 2.5mg for 12.5mg total each week.  Pharmacy Follow-up: 3/25 @ 9:30am Mounjaro titration and metformin taper based on BG control  PCP Follow-up: none at this time - 6 months    Notify your healthcare provider if you have any of the following:  -Diarrhea or vomiting for more than six hours  -Blood sugar >300 mg/dL more than once  -Low blood sugar (<70 mg/dL)  -Questions about your medications    Thank you,  Carrie Hebert, PharmD    Continue all meds under the continuation of care with the referring provider and  clinical pharmacy team.  Verbal consent to manage patient's drug therapy was obtained. They were informed they may decline to participate or withdraw from participation in pharmacy services at any time.

## 2025-03-05 ENCOUNTER — PHARMACY VISIT (OUTPATIENT)
Dept: PHARMACY | Facility: CLINIC | Age: 50
End: 2025-03-05
Payer: COMMERCIAL

## 2025-03-05 PROCEDURE — RXMED WILLOW AMBULATORY MEDICATION CHARGE

## 2025-03-25 ENCOUNTER — APPOINTMENT (OUTPATIENT)
Dept: PHARMACY | Facility: HOSPITAL | Age: 50
End: 2025-03-25
Payer: COMMERCIAL

## 2025-03-25 DIAGNOSIS — M25.859 HIP IMPINGEMENT SYNDROME, UNSPECIFIED LATERALITY: ICD-10-CM

## 2025-03-25 DIAGNOSIS — E11.9 TYPE 2 DIABETES MELLITUS WITHOUT COMPLICATION, WITHOUT LONG-TERM CURRENT USE OF INSULIN: ICD-10-CM

## 2025-03-25 DIAGNOSIS — E78.5 HYPERLIPIDEMIA, UNSPECIFIED HYPERLIPIDEMIA TYPE: ICD-10-CM

## 2025-03-25 NOTE — PROGRESS NOTES
"Pharmacist Clinic: Diabetes Management  Danial Navarro Jr. is a 49 y.o. male who presents for a follow-up evaluation of his Type 2 diabetes mellitus.   Referring Provider: DO HUY Cameron    Allergies   Allergen Reactions    Penicillins Unknown     Almost 50 years ago       LAB REVIEW   Glucose (mg/dL)   Date Value   12/02/2024 160 (H)   04/14/2023 222 (H)   01/10/2022 155 (H)   09/17/2020 219 (H)     POC HEMOGLOBIN A1c (%)   Date Value   05/31/2024 6.9 (A)   03/04/2024 9.8 (A)   11/02/2023 9.3 (A)     Hemoglobin A1C (%)   Date Value   12/02/2024 7.1 (H)     Urea Nitrogen (mg/dL)   Date Value   12/02/2024 10   04/14/2023 12   01/10/2022 13   09/17/2020 15     Creatinine (mg/dL)   Date Value   12/02/2024 1.01   04/14/2023 0.95   01/10/2022 1.00   09/17/2020 0.92     No results found for: \"ALBUR\", \"[id number]\"  Lab Results   Component Value Date    CHOL 134 12/02/2024    CHOL 138 04/14/2023    CHOL 219 (H) 01/10/2022     Lab Results   Component Value Date    HDL 37.8 12/02/2024    HDL 35.0 (A) 04/14/2023    HDL 39.0 (A) 01/10/2022     Lab Results   Component Value Date    LDLCALC 61 12/02/2024     Lab Results   Component Value Date    TRIG 175 (H) 12/02/2024    TRIG 208 (H) 04/14/2023    TRIG 384 (H) 01/10/2022     DIABETES ASSESSMENT    Current Diabetes Medications:   Metformin ER 500mg - 2 tabs (1000mg)  QPM  No side effects  Mounjaro 12.5mg weekly on Wed   No side effects. Has chronic constipation, but it's not as bad at the moment  Has appetite suppression     Historical Diabetes Medications:  Janumet (rashes)   Ozempic 0.5 mg weekly on Fri (switched to Mounjaro which is more tolerable and effective)  Glimepiride 2mg once daily at dinner (therapy completed as BG became more controlled, started having hypoglycemia)     SMBG Measurements  Patient is using: glucometer [employer]   The patient is currently checking the blood glucose 3-4 times per day.  Patient does not report symptoms of hypoglycemia. Patient " reports none.  Hypoglycemia frequency: none  Hypoglycemia awareness: No   Patient does not report symptoms of hyperglycemia. Patient previously reports blurry vision, excessive thirst, fatigue, and polyuria. He has not noticed symptoms with current regimen  FB  PPBs  highest 130s    Diet: Not as hungry but still eating. Not drinking as much soda    PRIMARY PREVENTION  - Statin? Yes, atorvastatin 40mg daily  - ACE-I/ARB? Yes, lisinopril 10mg daily  - Aspirin? No  - Last eye exam:  - Last diabetic foot exam:    The 10-year ASCVD risk score (Kate CONARD, et al., 2019) is: 5%    Values used to calculate the score:      Age: 49 years      Sex: Male      Is Non- : No      Diabetic: Yes      Tobacco smoker: No      Systolic Blood Pressure: 109 mmHg      Is BP treated: Yes      HDL Cholesterol: 37.8 mg/dL      Total Cholesterol: 134 mg/dL    Baseline weight: 188 lbs (reports fluctuation between 186-190lbs)  Wt Readings from Last 5 Encounters:   25 75.5 kg (166 lb 6.4 oz)   25 77.6 kg (171 lb)   25 80.7 kg (178 lb)   24 86.8 kg (191 lb 6 oz)   24 85.5 kg (188 lb 9.6 oz)     BMI Readings from Last 5 Encounters:   25 23.21 kg/m²   25 23.85 kg/m²   25 24.83 kg/m²   24 26.69 kg/m²   24 26.30 kg/m²     ADHERENCE/AFFORDABILITY  - new insurance that requires pt to stay in [facility] network but pt wants to stay with  providers. Labs will need to be sent to [facility] for coverage. May get new insurance through new job.     Assessment/Plan   Problem List Items Addressed This Visit       HLD (hyperlipidemia)    Relevant Medications    atorvastatin (Lipitor) 40 mg tablet    Type 2 diabetes mellitus    Relevant Medications    lisinopril 10 mg tablet    metFORMIN  mg 24 hr tablet    Other Relevant Orders    Referral to Clinical Pharmacy   Patient identified self-management goal:  BG control, weight loss, get off metformin  Patients  diabetes was improved with Mounjaro  Patient's goal A1c is < 7%.  Is pt at goal? No, 7.1% on 12/2/24 but very close to goal    Patient's SMBGs are reportedly stable recently and slightly elevated FBG.   Rationale for plan: Patient's overall SBG are within range on current metformin ER and in Mounjaro regimen. Noticeable weight loss by 4 lbs and likely still losing weight. Has chronic constipation that may be improving. Given current BG readings with some concern for lower PPBG, will continue Mounjaro.   Medication Changes:  CONTINUE:  Mounjaro 12.5mg weekly on Wed (2/26-)  Metformin  mg - 2 tabs (1000 mg) with dinner  Compliance at present is estimated to be good. Efforts to improve compliance (if necessary) will be directed at   dietary modifications: try to still eat throughout the day to avoid snacking overnight, increase fiber to help with constipation  increased exercise: walking more (after dinner)  regular blood sugar monitoring: 3-4 times daily with tracking of BG every morning and 2 hours after meals.  Primary prevention: pt requests refills for his BP and cholesterol medications to be sent to [place] pharmacy. Labs are within range and BP is controlled  Continue:  Lisinopril 10mg daily  Atorvastatin 40mg daily  Education Provided to Patient: BG monitoring and patient is to call me if he experiences multiple hypoglycemic episodes. Take Mounjaro 10mg + 2.5mg for 12.5mg total each week.  Pharmacy Follow-up: 5/14 @ 9:30am Mounjaro titration and metformin taper based on BG control  PCP Follow-up: none at this time - 6 months    Notify your healthcare provider if you have any of the following:  -Diarrhea or vomiting for more than six hours  -Blood sugar >300 mg/dL more than once  -Low blood sugar (<70 mg/dL)  -Questions about your medications    Thank you,  Carrie Hebert, PharmD    Continue all meds under the continuation of care with the referring provider and clinical pharmacy team.  Verbal consent  to manage patient's drug therapy was obtained. They were informed they may decline to participate or withdraw from participation in pharmacy services at any time.

## 2025-03-26 VITALS — WEIGHT: 166.4 LBS | BODY MASS INDEX: 23.21 KG/M2

## 2025-03-26 PROCEDURE — RXMED WILLOW AMBULATORY MEDICATION CHARGE

## 2025-03-26 RX ORDER — MELOXICAM 7.5 MG/1
7.5 TABLET ORAL DAILY
Qty: 90 TABLET | Refills: 3 | Status: SHIPPED | OUTPATIENT
Start: 2025-03-26 | End: 2026-03-26

## 2025-03-26 RX ORDER — LISINOPRIL 10 MG/1
10 TABLET ORAL DAILY
Qty: 90 TABLET | Refills: 3 | Status: SHIPPED | OUTPATIENT
Start: 2025-03-26

## 2025-03-26 RX ORDER — ATORVASTATIN CALCIUM 40 MG/1
40 TABLET, FILM COATED ORAL DAILY
Qty: 90 TABLET | Refills: 3 | Status: SHIPPED | OUTPATIENT
Start: 2025-03-26

## 2025-03-26 RX ORDER — METFORMIN HYDROCHLORIDE 500 MG/1
1000 TABLET, EXTENDED RELEASE ORAL
Qty: 60 TABLET | Refills: 6 | Status: SHIPPED | OUTPATIENT
Start: 2025-03-26

## 2025-03-27 PROCEDURE — RXMED WILLOW AMBULATORY MEDICATION CHARGE

## 2025-03-28 PROCEDURE — RXMED WILLOW AMBULATORY MEDICATION CHARGE

## 2025-03-29 ENCOUNTER — PHARMACY VISIT (OUTPATIENT)
Dept: PHARMACY | Facility: CLINIC | Age: 50
End: 2025-03-29
Payer: COMMERCIAL

## 2025-04-07 PROCEDURE — RXMED WILLOW AMBULATORY MEDICATION CHARGE

## 2025-04-12 ENCOUNTER — PHARMACY VISIT (OUTPATIENT)
Dept: PHARMACY | Facility: CLINIC | Age: 50
End: 2025-04-12
Payer: COMMERCIAL

## 2025-05-06 PROCEDURE — RXMED WILLOW AMBULATORY MEDICATION CHARGE

## 2025-05-10 PROCEDURE — RXMED WILLOW AMBULATORY MEDICATION CHARGE

## 2025-05-13 ENCOUNTER — PHARMACY VISIT (OUTPATIENT)
Dept: PHARMACY | Facility: CLINIC | Age: 50
End: 2025-05-13
Payer: COMMERCIAL

## 2025-05-14 ENCOUNTER — APPOINTMENT (OUTPATIENT)
Dept: PHARMACY | Facility: HOSPITAL | Age: 50
End: 2025-05-14
Payer: COMMERCIAL

## 2025-05-14 DIAGNOSIS — E78.5 HYPERLIPIDEMIA, UNSPECIFIED HYPERLIPIDEMIA TYPE: Primary | ICD-10-CM

## 2025-05-14 DIAGNOSIS — E11.9 TYPE 2 DIABETES MELLITUS WITHOUT COMPLICATION, WITHOUT LONG-TERM CURRENT USE OF INSULIN: ICD-10-CM

## 2025-05-14 PROCEDURE — RXMED WILLOW AMBULATORY MEDICATION CHARGE

## 2025-05-14 RX ORDER — TIRZEPATIDE 12.5 MG/.5ML
12.5 INJECTION, SOLUTION SUBCUTANEOUS WEEKLY
Qty: 2 ML | Refills: 1 | Status: SHIPPED | OUTPATIENT
Start: 2025-05-14

## 2025-05-14 NOTE — Clinical Note
Pt is doing very well with current Mounjaro and metformin. Will see if BG remains controlled while off metformin to determine next step at follow-up. Thanks! Carrie Hebert, PharmD

## 2025-05-14 NOTE — PROGRESS NOTES
"Pharmacist Clinic: Diabetes Management  Danial Navarro Jr. is a 49 y.o. male who presents for a follow-up evaluation of his Type 2 diabetes mellitus.   Referring Provider: DO HUY Cameron    Allergies   Allergen Reactions    Penicillins Unknown     Almost 50 years ago       LAB REVIEW   Glucose (mg/dL)   Date Value   12/02/2024 160 (H)   04/14/2023 222 (H)   01/10/2022 155 (H)   09/17/2020 219 (H)     POC HEMOGLOBIN A1c (%)   Date Value   05/31/2024 6.9 (A)   03/04/2024 9.8 (A)   11/02/2023 9.3 (A)     Hemoglobin A1C (%)   Date Value   12/02/2024 7.1 (H)     Urea Nitrogen (mg/dL)   Date Value   12/02/2024 10   04/14/2023 12   01/10/2022 13   09/17/2020 15     Creatinine (mg/dL)   Date Value   12/02/2024 1.01   04/14/2023 0.95   01/10/2022 1.00   09/17/2020 0.92     No results found for: \"ALBUR\", \"LOX32SCG\"  Lab Results   Component Value Date    CHOL 134 12/02/2024    CHOL 138 04/14/2023    CHOL 219 (H) 01/10/2022     Lab Results   Component Value Date    HDL 37.8 12/02/2024    HDL 35.0 (A) 04/14/2023    HDL 39.0 (A) 01/10/2022     Lab Results   Component Value Date    LDLCALC 61 12/02/2024     Lab Results   Component Value Date    TRIG 175 (H) 12/02/2024    TRIG 208 (H) 04/14/2023    TRIG 384 (H) 01/10/2022     DIABETES ASSESSMENT    Current Diabetes Medications:   Metformin ER 500mg - 2 tabs (1000mg) with dinner  No side effects  Mounjaro 12.5mg weekly on Wed   No side effects. Chronic constipation has resolved  Has appetite suppression     Historical Diabetes Medications:  Janumet (rashes)   Ozempic 0.5 mg weekly on Fri (switched to Mounjaro which is more tolerable and effective)  Glimepiride 2mg once daily at dinner (therapy completed as BG became more controlled, started having hypoglycemia)     SMBG Measurements  Patient is using: glucometer [employer]   The patient is currently checking the blood glucose 3-4 times per day.  Patient does not report symptoms of hypoglycemia. Patient reports " none.  Hypoglycemia frequency: none  Hypoglycemia awareness: No   Patient does not report symptoms of hyperglycemia. Patient previously reports blurry vision, excessive thirst, fatigue, and polyuria. He has not noticed symptoms with current regimen  FB-125  PPBG:   Lunch 120s  highest 130s  Dinner 120s (a little lower than other time readings    Diet: Not as hungry but still eating. Not drinking as much soda    PRIMARY PREVENTION  - Statin? Yes, atorvastatin 40mg daily  - ACE-I/ARB? Yes, lisinopril 10mg daily  - Aspirin? No  - Last eye exam:  - Last diabetic foot exam:    The 10-year ASCVD risk score (Kate CONRAD, et al., 2019) is: 5%    Values used to calculate the score:      Age: 49 years      Sex: Male      Is Non- : No      Diabetic: Yes      Tobacco smoker: No      Systolic Blood Pressure: 109 mmHg      Is BP treated: Yes      HDL Cholesterol: 37.8 mg/dL      Total Cholesterol: 134 mg/dL    Baseline weight: 188 lbs (reports fluctuation between 186-190lbs)  Wt Readings from Last 5 Encounters:   25 75.5 kg (166 lb 6.4 oz)   25 77.6 kg (171 lb)   25 80.7 kg (178 lb)   24 86.8 kg (191 lb 6 oz)   24 85.5 kg (188 lb 9.6 oz)     BMI Readings from Last 5 Encounters:   25 23.21 kg/m²   25 23.85 kg/m²   25 24.83 kg/m²   24 26.69 kg/m²   24 26.30 kg/m²     ADHERENCE/AFFORDABILITY  - new insurance that requires pt to stay in [facility] network but pt wants to stay with  providers. Labs will need to be sent to [facility] for coverage. May get new insurance through new job.     Assessment/Plan   Problem List Items Addressed This Visit       HLD (hyperlipidemia) - Primary    Relevant Orders    Lipid panel    Type 2 diabetes mellitus    Relevant Medications    tirzepatide (Mounjaro) 12.5 mg/0.5 mL pen injector    Other Relevant Orders    Referral to Clinical Pharmacy    Comprehensive Metabolic Panel    Hemoglobin A1c    Vitamin B12     Lipid panel     Patient identified self-management goal:  BG control, weight loss, get off metformin  Patients diabetes was improved with Mounjaro  Patient's goal A1c is < 7%.  Is pt at goal? No, 7.1% on 12/2/24 but very close to goal    Patient's SMBGs are reportedly stable recently and slightly elevated FBG.   Rationale for plan: Patient's overall SBG are within range on current metformin ER and in Mounjaro regimen. Noticeable weight loss by 4 lbs which has reached a plateau. Chronic constipation has resolved. Given current BG readings with little concern for lower PPBG, will continue Mounjaro 12.5mg and attempt to stop metformin for a few weeks since patient may already forget to take metformin sometimes. Labs are due  Medication Changes:  CONTINUE:  Mounjaro 12.5mg weekly on Wed (2/26-)  STOP:  Metformin  mg - 2 tabs (1000 mg) with dinner  Future considerations:  Increase to Mounjaro 15mg weekly if BG is elevated  Continue Mounjaro 12.5mg and metformin 1000mg daily if pt prefers this. May experience minimal additional weight loss with 15mg  Compliance at present is estimated to be good. Efforts to improve compliance (if necessary) will be directed at   dietary modifications: try to still eat throughout the day to avoid snacking overnight, increase fiber to help with constipation  increased exercise: walking more (after dinner)  regular blood sugar monitoring: 3-4 times daily with tracking of BG every morning and 2 hours after meals.  Primary prevention: pt requests refills for his BP and cholesterol medications to be sent to [facility]. BP is controlled. If TG remain elevated >150, will likely increase atorvastatin  Continue:  Lisinopril 10mg daily  Atorvastatin 40mg daily  Education Provided to Patient: BG monitoring and patient is to call me if he experiences multiple hypoglycemic episodes.  Pharmacy Follow-up: 5/28 @ 9:30am Mounjaro titration and metformin taper based on BG control  PCP  Follow-up: none at this time - 6 months (out of ins network and would need to go to [facility] or pay out of pocket with Dr. Youssef)    Notify your healthcare provider if you have any of the following:  -Diarrhea or vomiting for more than six hours  -Blood sugar >300 mg/dL more than once  -Low blood sugar (<70 mg/dL)  -Questions about your medications    Thank you,  Carrie Hebert, PharmD    Continue all meds under the continuation of care with the referring provider and clinical pharmacy team.  Verbal consent to manage patient's drug therapy was obtained. They were informed they may decline to participate or withdraw from participation in pharmacy services at any time.

## 2025-05-21 ENCOUNTER — PHARMACY VISIT (OUTPATIENT)
Dept: PHARMACY | Facility: CLINIC | Age: 50
End: 2025-05-21
Payer: COMMERCIAL

## 2025-05-28 ENCOUNTER — APPOINTMENT (OUTPATIENT)
Dept: PHARMACY | Facility: HOSPITAL | Age: 50
End: 2025-05-28
Payer: COMMERCIAL

## 2025-05-28 DIAGNOSIS — E11.9 TYPE 2 DIABETES MELLITUS WITHOUT COMPLICATION, WITHOUT LONG-TERM CURRENT USE OF INSULIN: ICD-10-CM

## 2025-05-28 PROCEDURE — RXMED WILLOW AMBULATORY MEDICATION CHARGE

## 2025-05-28 RX ORDER — TIRZEPATIDE 12.5 MG/.5ML
12.5 INJECTION, SOLUTION SUBCUTANEOUS WEEKLY
Qty: 2 ML | Refills: 12 | Status: SHIPPED | OUTPATIENT
Start: 2025-05-28

## 2025-05-28 RX ORDER — BLOOD-GLUCOSE CONTROL, NORMAL
EACH MISCELLANEOUS
Qty: 100 EACH | Refills: 11 | Status: SHIPPED | OUTPATIENT
Start: 2025-05-28

## 2025-05-28 RX ORDER — INSULIN PUMP SYRINGE, 3 ML
EACH MISCELLANEOUS
Qty: 1 EACH | Refills: 0 | Status: SHIPPED | OUTPATIENT
Start: 2025-05-28 | End: 2026-05-28

## 2025-05-28 RX ORDER — ISOPROPYL ALCOHOL 70 ML/100ML
SWAB TOPICAL
Qty: 100 EACH | Refills: 11 | Status: SHIPPED | OUTPATIENT
Start: 2025-05-28

## 2025-05-28 NOTE — PROGRESS NOTES
"Pharmacist Clinic: Diabetes Management  Danial Navarro Jr. is a 49 y.o. male who presents for a follow-up evaluation of his Type 2 diabetes mellitus.   Referring Provider: DO HUY Cameron    Allergies   Allergen Reactions    Penicillins Unknown     Almost 50 years ago       LAB REVIEW   Glucose (mg/dL)   Date Value   12/02/2024 160 (H)   04/14/2023 222 (H)   01/10/2022 155 (H)   09/17/2020 219 (H)     POC HEMOGLOBIN A1c (%)   Date Value   05/31/2024 6.9 (A)   03/04/2024 9.8 (A)   11/02/2023 9.3 (A)     Hemoglobin A1C (%)   Date Value   12/02/2024 7.1 (H)     Urea Nitrogen (mg/dL)   Date Value   12/02/2024 10   04/14/2023 12   01/10/2022 13   09/17/2020 15     Creatinine (mg/dL)   Date Value   12/02/2024 1.01   04/14/2023 0.95   01/10/2022 1.00   09/17/2020 0.92     No results found for: \"ALBUR\", \"[id number]\"  Lab Results   Component Value Date    CHOL 134 12/02/2024    CHOL 138 04/14/2023    CHOL 219 (H) 01/10/2022     Lab Results   Component Value Date    HDL 37.8 12/02/2024    HDL 35.0 (A) 04/14/2023    HDL 39.0 (A) 01/10/2022     Lab Results   Component Value Date    LDLCALC 61 12/02/2024     Lab Results   Component Value Date    TRIG 175 (H) 12/02/2024    TRIG 208 (H) 04/14/2023    TRIG 384 (H) 01/10/2022     DIABETES ASSESSMENT    Current Diabetes Medications:   Mounjaro 12.5mg weekly on Wed   No side effects. Chronic constipation has resolved  Has appetite suppression     Historical Diabetes Medications:  Janumet (rashes)   Ozempic 0.5 mg weekly on Fri (switched to Mounjaro which is more tolerable and effective)  Glimepiride 2mg once daily at dinner (therapy completed as BG became more controlled, started having hypoglycemia)  Metformin ER 500mg (therapy complete after Mounjaro titration)     SMBG Measurements  Patient is using: glucometer [employer]   The patient is currently checking the blood glucose 3-4 times per day.  Patient does not report symptoms of hypoglycemia. Patient reports none but may feel " tired possibly due to work  Hypoglycemia frequency: none  Hypoglycemia awareness: No   Patient does not report symptoms of hyperglycemia. Patient previously reports blurry vision, excessive thirst, fatigue, and polyuria. He has not noticed symptoms with current regimen   Before meals After meals   Date AM Noon PM   15-May 120 125 119   16-May 117 140 127   17-May 139 127 120   18-May 125 123 98   19-May 120 100 116   20-May 121 109 107   21-May 120 98 110   22-May 130 109 116   23-May 125 88 119   24-May 128 130 125   25-May 130 94 113   26-May 128 108 116   27-May 123 109 122   28-May 121       Diet: Not as hungry but still eating. Not drinking as much soda.     PRIMARY PREVENTION  - Statin? Yes, atorvastatin 40mg daily  - ACE-I/ARB? Yes, lisinopril 10mg daily  - Aspirin? No  - Last eye exam:  - Last diabetic foot exam:    The 10-year ASCVD risk score (Kate CONRAD, et al., 2019) is: 5%    Values used to calculate the score:      Age: 49 years      Sex: Male      Is Non- : No      Diabetic: Yes      Tobacco smoker: No      Systolic Blood Pressure: 109 mmHg      Is BP treated: Yes      HDL Cholesterol: 37.8 mg/dL      Total Cholesterol: 134 mg/dL    Baseline weight: 188 lbs (reports fluctuation between 186-190lbs)  Wt Readings from Last 5 Encounters:   03/26/25 75.5 kg (166 lb 6.4 oz)   02/25/25 77.6 kg (171 lb)   01/27/25 80.7 kg (178 lb)   12/02/24 86.8 kg (191 lb 6 oz)   11/01/24 85.5 kg (188 lb 9.6 oz)     BMI Readings from Last 5 Encounters:   03/26/25 23.21 kg/m²   02/25/25 23.85 kg/m²   01/27/25 24.83 kg/m²   12/02/24 26.69 kg/m²   11/01/24 26.30 kg/m²     ADHERENCE/AFFORDABILITY  - new insurance that requires pt to stay in [facility] network but pt wants to stay with  providers. Labs will need to be sent to [facility] for coverage. May get new insurance through new job.   - New ins may or may not be covering for testing supplies but pharmacy does does cheaper coverage that is  affordable for pt  True Metrix kit: $15  True Metrix test strips: $30 for 100 count  TruePLUS lancets: ~$7 for 100 count  Alcohol pads: ~$5 for 100 count    Assessment/Plan   Problem List Items Addressed This Visit       Type 2 diabetes mellitus    Relevant Medications    tirzepatide (Mounjaro) 12.5 mg/0.5 mL pen injector    Blood glucose monitoring (True Metrix Glucose Meter) meter    blood sugar diagnostic    lancets misc    alcohol swabs (Alcohol Prep Pads)     Patient identified self-management goal:  BG control, weight loss, get off metformin  Patients diabetes was improved with Mounjaro  Patient's goal A1c is < 7%.  Is pt at goal? No, 7.1% on 12/2/24 but very close to goal    Patient's SMBGs are reportedly stable recently and slightly elevated FBG.   Rationale for plan: Patient's overall SBG are within range on Mounjaro 12.5mg without metformin. Noticeable weight loss has reached a plateau, but his weight is at a good area and does not require additional weight loss. Chronic constipation has resolved. Denies any GI issues. Given current BG readings with little concern for lower PPBG, will remain on Mounjaro 12.5mg. Labs are due  Medication Changes:  CONTINUE:  Mounjaro 12.5mg weekly on Wed (2/26-)  Compliance at present is estimated to be good. Efforts to improve compliance (if necessary) will be directed at   dietary modifications: try to still eat throughout the day to avoid snacking overnight, increase fiber to help with constipation  increased exercise: walking more (after dinner)  regular blood sugar monitoring: 3-4 times daily with tracking of BG every morning and 2 hours after meals.  Primary prevention: pt prefers refills of all his medications through [facility] pharmacy. BP is controlled. If TG remain elevated >150, will likely increase atorvastatin. Otherwise, will benefit from additional lifestyle modification such as exercise.  Continue:  Lisinopril 10mg daily  Atorvastatin 40mg daily  Education  Provided to Patient: BG monitoring and patient is to call me if he experiences multiple hypoglycemic episodes.    Future considerations:  Increase to Mounjaro 15mg weekly if BG is elevated  Continue Mounjaro 12.5mg and metformin 1000mg daily if pt prefers this. May experience minimal additional weight loss with 15mg  If TG is elevated, increase atorvastatin    Pharmacy Follow-up: as needed when patient is able to figure out insurance. can call any time if he has pharmacy related questions/concerns  PCP Follow-up: none at this time - 6 months (out of ins network and would need to go to [facility] or pay out of pocket with Dr. Youssef)    Notify your healthcare provider if you have any of the following:  -Diarrhea or vomiting for more than six hours  -Blood sugar >300 mg/dL more than once  -Low blood sugar (<70 mg/dL)  -Questions about your medications    Thank you,  Carrie Hebert, PharmD    Continue all meds under the continuation of care with the referring provider and clinical pharmacy team.  Verbal consent to manage patient's drug therapy was obtained. They were informed they may decline to participate or withdraw from participation in pharmacy services at any time.

## 2025-05-28 NOTE — Clinical Note
Diabetes is well controlled and no additional weight loss needed, so no changes at this time. Pt will reach out if needed and when he is able to figure out his insurance. Thanks! Carrie Hebert, PharmD

## 2025-05-30 PROCEDURE — RXMED WILLOW AMBULATORY MEDICATION CHARGE

## 2025-06-03 PROCEDURE — RXMED WILLOW AMBULATORY MEDICATION CHARGE

## 2025-06-11 ENCOUNTER — PHARMACY VISIT (OUTPATIENT)
Dept: PHARMACY | Facility: CLINIC | Age: 50
End: 2025-06-11
Payer: COMMERCIAL

## 2025-06-11 PROCEDURE — RXMED WILLOW AMBULATORY MEDICATION CHARGE

## 2025-07-05 PROCEDURE — RXMED WILLOW AMBULATORY MEDICATION CHARGE

## 2025-07-08 PROCEDURE — RXMED WILLOW AMBULATORY MEDICATION CHARGE

## 2025-07-12 PROCEDURE — RXMED WILLOW AMBULATORY MEDICATION CHARGE

## 2025-07-15 ENCOUNTER — PHARMACY VISIT (OUTPATIENT)
Dept: PHARMACY | Facility: CLINIC | Age: 50
End: 2025-07-15
Payer: COMMERCIAL

## 2025-07-29 PROCEDURE — RXMED WILLOW AMBULATORY MEDICATION CHARGE

## 2025-08-06 PROCEDURE — RXMED WILLOW AMBULATORY MEDICATION CHARGE

## 2025-08-08 PROCEDURE — RXMED WILLOW AMBULATORY MEDICATION CHARGE

## 2025-08-12 ENCOUNTER — PHARMACY VISIT (OUTPATIENT)
Dept: PHARMACY | Facility: CLINIC | Age: 50
End: 2025-08-12
Payer: COMMERCIAL